# Patient Record
Sex: FEMALE | URBAN - METROPOLITAN AREA
[De-identification: names, ages, dates, MRNs, and addresses within clinical notes are randomized per-mention and may not be internally consistent; named-entity substitution may affect disease eponyms.]

---

## 2024-11-05 DIAGNOSIS — R10.11 RIGHT UPPER QUADRANT PAIN: Primary | ICD-10-CM

## 2024-11-07 ENCOUNTER — TELEPHONE (OUTPATIENT)
Dept: MATERNAL FETAL MEDICINE | Facility: CLINIC | Age: 29
End: 2024-11-07
Payer: MEDICAID

## 2024-11-07 DIAGNOSIS — O36.80X0 PREGNANCY WITH UNCERTAIN FETAL VIABILITY, SINGLE OR UNSPECIFIED FETUS: Primary | ICD-10-CM

## 2024-11-11 ENCOUNTER — TELEPHONE (OUTPATIENT)
Dept: MATERNAL FETAL MEDICINE | Facility: CLINIC | Age: 29
End: 2024-11-11
Payer: MEDICAID

## 2024-11-15 ENCOUNTER — PROCEDURE VISIT (OUTPATIENT)
Dept: MATERNAL FETAL MEDICINE | Facility: CLINIC | Age: 29
End: 2024-11-15
Payer: MEDICAID

## 2024-11-15 DIAGNOSIS — Z36.89 ENCOUNTER FOR FETAL ANATOMIC SURVEY: Primary | ICD-10-CM

## 2024-11-15 DIAGNOSIS — O36.80X0 PREGNANCY WITH UNCERTAIN FETAL VIABILITY, SINGLE OR UNSPECIFIED FETUS: ICD-10-CM

## 2024-11-15 PROCEDURE — 76801 OB US < 14 WKS SINGLE FETUS: CPT | Mod: PBBFAC | Performed by: OBSTETRICS & GYNECOLOGY

## 2024-11-26 ENCOUNTER — HOSPITAL ENCOUNTER (EMERGENCY)
Facility: HOSPITAL | Age: 29
Discharge: HOME | End: 2024-11-26
Attending: EMERGENCY MEDICINE
Payer: MEDICAID

## 2024-11-26 VITALS
RESPIRATION RATE: 16 BRPM | OXYGEN SATURATION: 100 % | TEMPERATURE: 98 F | DIASTOLIC BLOOD PRESSURE: 81 MMHG | HEART RATE: 78 BPM | WEIGHT: 159 LBS | SYSTOLIC BLOOD PRESSURE: 124 MMHG

## 2024-11-26 DIAGNOSIS — Z3A.12 12 WEEKS GESTATION OF PREGNANCY: Primary | ICD-10-CM

## 2024-11-26 PROCEDURE — 99284 EMERGENCY DEPT VISIT MOD MDM: CPT | Mod: 25

## 2024-11-26 NOTE — DISCHARGE INSTRUCTIONS
Ultrasound Is normal.  Follow-up with your OB.    Thank you for coming to our Emergency Department today. It is important to remember that some problems are difficult to diagnose and may not be found during your first visit. Be sure to follow up with your primary care doctor and review any labs/imaging that was performed with them. If you do not have a primary care doctor, you may contact the one listed on your discharge paperwork or you may also call the [facility] Appointment Desk at [phone] to schedule an appointment with one.     All medications may potentially have side effects and it is impossible to predict which medications may give you side effects. If you feel that you are having a negative effect of any medication you should immediately stop taking them and seek medical attention.    Return to the ER with any questions/concerns, new/concerning symptoms, worsening or failure to improve. Do not drive or make any important decisions for 24 hours if you have received any pain medications, sedatives or mood altering drugs during your ER visit.

## 2024-11-26 NOTE — ED TRIAGE NOTES
Pt to ED from [facility]. States she went to clinic for routine prenatal visit. States she was sent to ED because fetal heart tones could not be assessed. Pt denies any abd cramping or vaginal bleeding. Pt states she is approximately 12 weeks pregnant. Pt denies any N/V or dysuria. Pt AAOx4. VSS

## 2024-11-26 NOTE — ED PROVIDER NOTES
Encounter Date: 2024    SCRIBE #1 NOTE: I, Arnulfo Maxwell Do, am scribing for, and in the presence of,  Floyd Betancourt MD. I have scribed the following portions of the note - Other sections scribed: HPI, ROS, PE.       History     Chief Complaint   Patient presents with    Miscarriage     Pt arrived in ED, c/o possible miscarriage. Pt is 12 weeks gestation. Pt reports being at a routine doctor's appointment a few minutes ago and being sent to ED, due to them not finding a heartbeat. Pt c/o headache and n/v. Pt denies any CP, SOB, abd pain or diarrhea.      This is a 30 y/o female  at 12w2d gestational age with no PMHx, who presents to the Emergency department complaining of a possible miscarriage onset today. Per chart review 2024, patient was seen for a routine prenatal appointment at [facility]. She denies any loss of fluid at this visit. There were non-reassuring fetal heart tones. Patient reports she was referred to the ED for further evaluation. She does report some mild general abdominal pain this morning, however notes her pain has since resolved. She also reports intermittent lower back pain, however denies any currently. No other exacerbating or alleviating factors. Patient denies any vaginal bleeding, vaginal pain, dysuria, or other associated symptoms.    The history is provided by the patient. A  was used (Uriel. Lori Cody LPN at bedside).     Review of patient's allergies indicates:  No Known Allergies  History reviewed. No pertinent past medical history.  History reviewed. No pertinent surgical history.  No family history on file.  Social History     Tobacco Use    Smoking status: Never     Passive exposure: Never    Smokeless tobacco: Never   Substance Use Topics    Alcohol use: Never    Drug use: Never     Review of Systems   Constitutional:  Negative for fever.   HENT:  Negative for facial swelling and voice change.    Eyes:  Negative for pain.    Respiratory:  Negative for choking and shortness of breath.    Cardiovascular:  Negative for chest pain.   Gastrointestinal:  Positive for abdominal pain. Negative for nausea and vomiting.   Genitourinary:  Negative for dysuria and frequency.   Musculoskeletal:  Positive for back pain.   Skin:  Negative for rash.   Neurological:  Negative for weakness and numbness.   Psychiatric/Behavioral:  Negative for self-injury.        Physical Exam     Initial Vitals [11/26/24 1014]   BP Pulse Resp Temp SpO2   126/82 83 16 98.2 °F (36.8 °C) 100 %      MAP       --         Physical Exam    Nursing note and vitals reviewed.  Constitutional: She is not diaphoretic. No distress.   HENT:   Head: Normocephalic and atraumatic.   Protecting airway   Eyes: Conjunctivae and EOM are normal. No scleral icterus.   Neck: Neck supple. No tracheal deviation present.   Normal range of motion.  Cardiovascular:  Normal rate, regular rhythm and intact distal pulses.           Pulmonary/Chest: No stridor. No respiratory distress.   Speaking in full sentences   Abdominal: Abdomen is soft. She exhibits no distension. There is no abdominal tenderness.   Musculoskeletal:         General: No tenderness or edema.      Cervical back: Normal range of motion and neck supple.     Neurological: She is alert. She has normal strength. No cranial nerve deficit or sensory deficit.   Skin: Skin is warm and dry.   Psychiatric: She has a normal mood and affect.         ED Course   Procedures  Labs Reviewed - No data to display         Imaging Results              US OB <14 Wks, TransAbd, Single Gestation (Final result)  Result time 11/26/24 11:57:01   Procedure changed from US OB <14 Wks TransAbd & TransVag, Single Gestation (XPD)     Final result by Godfrey Jeter MD (11/26/24 11:57:01)                   Impression:      Single live intrauterine pregnancy with estimated gestational age 12 weeks 2 days and estimated due date 06/08/2025.      Electronically  signed by: Godfrey Jeter  Date:    11/26/2024  Time:    11:57               Narrative:    EXAMINATION:  US OB <14 WEEKS TRANSABDOM, SINGLE GESTATION    CLINICAL HISTORY:  Vag Bleeding;    TECHNIQUE:  Transabdominal sonography of the pelvis was performed, followed by transvaginal sonography to better evaluate the uterus and ovaries.    COMPARISON:  None.    FINDINGS:  Intrauterine gestation(s): Single    Gestational sac: Present.    Yolk sac: None.    Crown-rump length (CRL): 5.7 cm    Cardiac activity: 159 bpm    Subchorionic hemorrhage: None.    Right ovary: There is a 3.0 cm cystic lesion in the right ovary, likely a corpus luteum cyst.    Left ovary: Normal.    Miscellaneous: No Free Fluid.                                       Medications - No data to display    Medical Decision Making  Differential diagnosis include but are not limited to: 1st trimester pregnancy, worried well, pregnancy complications.     Patient is afebrile and in no acute distress at time history and physical.  Vitals within acceptable ranges.  She has no abdominal tenderness.  She denies vaginal bleeding or leakage of fluids.  Review of chart demonstrates that patient was sent from clinic due to nurse not being able to locate fetal heart tones.  Ultrasounds demonstrates a single intrauterine pregnancy with a fetal heart rate of 159.  Patient remains clinically stable in the emergency department.  Patient does not appear to require further emergent management.  She is clinically stable and fit for discharge to follow up with her OB.  counseled on supportive care, appropriate medication usage, concerning symptoms for which to return to ER and the importance of follow up. Understanding and agreement with treatment plan was expressed.         Amount and/or Complexity of Data Reviewed  External Data Reviewed: notes.     Details: See HPI.  Radiology: ordered. Decision-making details documented in ED Course.            Scribe Attestation:   Scribe  #1: I performed the above scribed service and the documentation accurately describes the services I performed. I attest to the accuracy of the note.                               Clinical Impression:  Final diagnoses:  [Z3A.12] 12 weeks gestation of pregnancy (Primary)       I, Floyd Betancourt , personally performed the services described in this documentation. All medical record entries made by the scribe were at my direction and in my presence. I have reviewed the chart and agree that the record reflects my personal performance and is accurate and complete.      DISCLAIMER: This note was prepared with [employer] voice recognition transcription software. Garbled syntax, mangled pronouns, and other bizarre constructions may be attributed to that software system.     ED Disposition Condition    Discharge Stable          ED Prescriptions    None       Follow-up Information       Follow up With Specialties Details Why Contact Info    Rusty, Ladan ARREOLA, APRN Family Medicine Schedule an appointment as soon as possible for a visit   [address] [facility] [address]  [phone]               Floyd Betancourt MD  11/26/24 6746

## 2024-11-29 ENCOUNTER — HOSPITAL ENCOUNTER (EMERGENCY)
Facility: HOSPITAL | Age: 29
Discharge: HOME | End: 2024-11-29
Attending: STUDENT IN AN ORGANIZED HEALTH CARE EDUCATION/TRAINING PROGRAM
Payer: MEDICAID

## 2024-11-29 VITALS
DIASTOLIC BLOOD PRESSURE: 66 MMHG | OXYGEN SATURATION: 100 % | SYSTOLIC BLOOD PRESSURE: 133 MMHG | HEART RATE: 97 BPM | HEIGHT: 63 IN | TEMPERATURE: 98 F | BODY MASS INDEX: 28.17 KG/M2 | WEIGHT: 159 LBS | RESPIRATION RATE: 18 BRPM

## 2024-11-29 DIAGNOSIS — R82.71 ASYMPTOMATIC BACTERIURIA DURING PREGNANCY: ICD-10-CM

## 2024-11-29 DIAGNOSIS — J06.9 VIRAL URI WITH COUGH: Primary | ICD-10-CM

## 2024-11-29 DIAGNOSIS — O99.891 ASYMPTOMATIC BACTERIURIA DURING PREGNANCY: ICD-10-CM

## 2024-11-29 LAB
BILIRUB UR QL STRIP: NEGATIVE
CLARITY UR: CLEAR
COLOR UR: YELLOW
CTP QC/QA: YES
CTP QC/QA: YES
GLUCOSE UR QL STRIP: NEGATIVE
HGB UR QL STRIP: NEGATIVE
KETONES UR QL STRIP: NEGATIVE
LEUKOCYTE ESTERASE UR QL STRIP: ABNORMAL
MICROSCOPIC COMMENT: ABNORMAL
NITRITE UR QL STRIP: NEGATIVE
PH UR STRIP: 6 [PH] (ref 5–8)
POC MOLECULAR INFLUENZA A AGN: NEGATIVE
POC MOLECULAR INFLUENZA B AGN: NEGATIVE
PROT UR QL STRIP: NEGATIVE
RBC #/AREA URNS HPF: 3 /HPF (ref 0–4)
SARS-COV-2 RDRP RESP QL NAA+PROBE: NEGATIVE
SP GR UR STRIP: 1.02 (ref 1–1.03)
SQUAMOUS #/AREA URNS HPF: 1 /HPF
URN SPEC COLLECT METH UR: ABNORMAL
UROBILINOGEN UR STRIP-ACNC: ABNORMAL EU/DL
WBC #/AREA URNS HPF: 12 /HPF (ref 0–5)

## 2024-11-29 PROCEDURE — 81000 URINALYSIS NONAUTO W/SCOPE: CPT

## 2024-11-29 PROCEDURE — 87086 URINE CULTURE/COLONY COUNT: CPT

## 2024-11-29 PROCEDURE — 87502 INFLUENZA DNA AMP PROBE: CPT

## 2024-11-29 PROCEDURE — 99284 EMERGENCY DEPT VISIT MOD MDM: CPT

## 2024-11-29 PROCEDURE — 87635 SARS-COV-2 COVID-19 AMP PRB: CPT

## 2024-11-29 RX ORDER — ACETAMINOPHEN 500 MG
500 TABLET ORAL EVERY 6 HOURS PRN
Qty: 20 TABLET | Refills: 0 | Status: SHIPPED | OUTPATIENT
Start: 2024-11-29

## 2024-11-29 RX ORDER — VITAMIN A 3000 MCG
1 CAPSULE ORAL
Qty: 50 ML | Refills: 0 | Status: SHIPPED | OUTPATIENT
Start: 2024-11-29

## 2024-11-29 RX ORDER — GUAIFENESIN 100 MG/5ML
100-200 SOLUTION ORAL EVERY 4 HOURS PRN
Qty: 60 ML | Refills: 0 | Status: SHIPPED | OUTPATIENT
Start: 2024-11-29 | End: 2024-12-09

## 2024-11-29 RX ORDER — CEPHALEXIN 500 MG/1
500 CAPSULE ORAL 2 TIMES DAILY
Qty: 10 CAPSULE | Refills: 0 | Status: SHIPPED | OUTPATIENT
Start: 2024-11-29 | End: 2024-12-04

## 2024-11-29 NOTE — Clinical Note
"Ruma "Ruma" Tiffany was seen and treated in our emergency department on 11/29/2024.  She may return to work on 12/02/2024.       If you have any questions or concerns, please don't hesitate to call.      David Reynoso PA-C"

## 2024-11-30 NOTE — DISCHARGE INSTRUCTIONS
You were seen in the emergency department today for viral URI.  Please take all medications as prescribed and as we discussed.  Follow-up with specialist if instructed to do so.  It is important to remember that some problems are difficult to diagnose and may not be found during your Emergency Department visit. Be sure to follow up with your primary care doctor and review all labs/imaging/tests that were performed during this visit with them. Some labs/tests may be outside of the normal range and require non-emergent follow-up and further investigation to help diagnose/exclude/prevent complications or other medical conditions. Return to the emergency department for any new or worsening symptoms. Thank you for allowing me to care for you today, it was my pleasure. I hope you get to feeling better soon!

## 2024-11-30 NOTE — ED PROVIDER NOTES
Encounter Date: 2024    SCRIBE #1 NOTE: IArgenis, am scribing for, and in the presence of,  David Reynoso PA-C. I have scribed the following portions of the note - Other sections scribed: HPI, ROS, PE.       History     Chief Complaint   Patient presents with    Nasal Congestion    Cough     Pt to ED from home with c/o cough, headache and congestion x 3 days. Pt states she is 12 weeks pregnant and is unsure of what medications she can take while pregnant. Pt denies n/v/d.     Patient is a 29 y.o. female  approximately 12 weeks gestational age with no past medical history who presents to the Emergency Department for evaluation of URI symptoms x 3 days.  Symptoms include headache, cough, congestion, and rhinorrhea.  She has not taken any medications for the symptoms. Her children are also being seen in the ED for the same symptoms.  She denies chest pain, shortness of breath, abdominal pain. Denies any other associated symptoms including pregnancy related. She states she is here more so because she is unsure what she can take.     The history is provided by the patient. The history is limited by a language barrier (Patient speaks Hatian Creole.). A  was used (Tanner CHIRINOS).     Review of patient's allergies indicates:  No Known Allergies  History reviewed. No pertinent past medical history.  History reviewed. No pertinent surgical history.  No family history on file.  Social History     Tobacco Use    Smoking status: Never     Passive exposure: Never    Smokeless tobacco: Never   Substance Use Topics    Alcohol use: Never    Drug use: Never     Review of Systems   Constitutional:  Negative for chills and fever.   HENT:  Positive for congestion and rhinorrhea. Negative for ear pain and sore throat.    Eyes:  Negative for visual disturbance.   Respiratory:  Positive for cough. Negative for shortness of breath.    Cardiovascular:  Negative for chest pain.   Gastrointestinal:   Negative for abdominal pain, nausea and vomiting.   Genitourinary:  Negative for decreased urine volume, dysuria, flank pain, frequency, menstrual problem and pelvic pain.   Musculoskeletal:  Negative for neck pain and neck stiffness.   Neurological:  Positive for headaches. Negative for dizziness and light-headedness.       Physical Exam     Initial Vitals [11/29/24 1807]   BP Pulse Resp Temp SpO2   133/66 97 18 98.4 °F (36.9 °C) 100 %      MAP       --         Physical Exam    Nursing note and vitals reviewed.  Constitutional: She appears well-developed and well-nourished.   HENT:   Head: Normocephalic and atraumatic.   Right Ear: External ear normal.   Left Ear: External ear normal.   There is no posterior oropharyngeal erythema however a postnasal drip is present, no tonsillar swelling, no oropharyngeal exudates, uvula is midline. Normal dentition. No trismus.  No muffled voice. No submandibular swelling. Patient is tolerating secretions without difficulty.  Patient is speaking in full sentences on exam without difficulty.  Bilateral tympanic membranes are pearly gray without erythema, bulging, perforation.  There is no postauricular swelling, or overlying erythema or tenderness to palpation over mastoids bilaterally.    Neck: Carotid bruit is not present.   Normal range of motion.  Cardiovascular:  Normal rate, regular rhythm, normal heart sounds and intact distal pulses.     Exam reveals no gallop and no friction rub.       No murmur heard.  Pulmonary/Chest: Breath sounds normal. No respiratory distress. She has no wheezes. She has no rhonchi. She has no rales.   Abdominal: Abdomen is soft. Bowel sounds are normal. There is no abdominal tenderness. There is no rebound and no guarding.   Musculoskeletal:         General: Normal range of motion.      Cervical back: Normal range of motion.     Neurological: She is alert and oriented to person, place, and time. GCS score is 15. GCS eye subscore is 4. GCS verbal  subscore is 5. GCS motor subscore is 6.   Psychiatric: She has a normal mood and affect.         ED Course   Procedures  Labs Reviewed   URINALYSIS, REFLEX TO URINE CULTURE - Abnormal       Result Value    Specimen UA Urine, Clean Catch      Color, UA Yellow      Appearance, UA Clear      pH, UA 6.0      Specific Gravity, UA 1.020      Protein, UA Negative      Glucose, UA Negative      Ketones, UA Negative      Bilirubin (UA) Negative      Occult Blood UA Negative      Nitrite, UA Negative      Urobilinogen, UA 2.0-3.0 (*)     Leukocytes, UA 2+ (*)     Narrative:     Specimen Source->Urine   URINALYSIS MICROSCOPIC - Abnormal    RBC, UA 3      WBC, UA 12 (*)     Squam Epithel, UA 1      Microscopic Comment SEE COMMENT      Narrative:     Specimen Source->Urine   CULTURE, URINE   CULTURE, URINE   C. TRACHOMATIS/N. GONORRHOEAE BY AMP DNA   SARS-COV-2 RDRP GENE    POC Rapid COVID Negative       Acceptable Yes     POCT INFLUENZA A/B MOLECULAR    POC Molecular Influenza A Ag Negative      POC Molecular Influenza B Ag Negative       Acceptable Yes            Imaging Results    None          Medications - No data to display  Medical Decision Making  This is an emergent evaluation of a 29 y.o. female  approximately 12 weeks gestational age with no past medical history who presents to the Emergency Department for evaluation of URI symptoms x 3 days.  Symptoms include headache, cough, congestion, and rhinorrhea.    Patient looks well clinically. There is no posterior oropharyngeal erythema however a postnasal drip is present, no tonsillar swelling, no oropharyngeal exudates, uvula is midline. Normal dentition. No trismus.  No muffled voice. No submandibular swelling. Patient is tolerating secretions without difficulty.  Patient is speaking in full sentences on exam without difficulty.  Bilateral tympanic membranes are pearly gray without erythema, bulging, perforation.  There is no  postauricular swelling, or overlying erythema or tenderness to palpation over mastoids bilaterally. Regular rate rhythm without murmurs.  No carotid bruits appreciated on exam. Lungs are clear to auscultation bilaterally.  Abdomen is soft, nontender, gravid abdomen, with normal bowel sounds.     Differential diagnosis includes but is not limited to COVID, flu, other viral syndrome.  Considered but doubt pneumonia.    Workup initiated with viral swabs.  Urinalysis ordered in triage.  Vital signs, chart, labs, and/or imaging were all reviewed.  See ED course below and interpretations above. My overall impression is asymptomatic bacteria pregnancy, viral syndrome. Will discharge home with nasal saline spray, Keflex, Robitussin, Tylenol with OBGYN follow-up. Patient is very well appearing, and in no acute distress. Vital signs are reassuring here in the emergency department, patient is afebrile, breathing comfortable, satting 100 % on room air. Patient/Caregiver is stable for discharge at this time.  Patient/Caregiver was informed of results and plan of care. Patient/Caregiver verbalized understanding of care plan. All questions and concerns were addressed. Discussed strict return precautions with the patient/caregiver. Instructed follow up with primary care provider within 1 week.      David Reynoso PA-C    DISCLAIMER: This note was prepared with [employer] voice recognition transcription software. Garbled syntax, mangled pronouns, and other bizarre constructions may be attributed to that software system.       Amount and/or Complexity of Data Reviewed  Labs: ordered. Decision-making details documented in ED Course.    Risk  OTC drugs.  Prescription drug management.            Scribe Attestation:   Scribe #1: I performed the above scribed service and the documentation accurately describes the services I performed. I attest to the accuracy of the note.        ED Course as of 11/29/24 2233 Fri Nov 29, 2024   1902 BP:  133/66 [TM]   1902 Temp: 98.4 °F (36.9 °C) [TM]   1902 Pulse: 97 [TM]   1902 Resp: 18 [TM]   1902 SpO2: 100 % [TM]   1941 POCT COVID-19 Rapid Screening  COVID negative. [TM]   1941 POCT Influenza A/B Molecular  Flu negative. [TM]   1942 Urinalysis, Reflex to Urine Culture Urine, Clean Catch(!)  Urinalysis showing 2+ leukocytes.  Ordered GC urine.  Ordered urine culture. [TM]   1943 Will discharge home with keflex, nasal saline nose spray, Tylenol, Robitussin with OBGYN follow-up.  No OBGYN related complaints today.  We will have her follow-up however. [TM]      ED Course User Index  [TM] David Reynoso PA-C I, Trevor Marler PA-C, personally performed the services described in this documentation. All medical record entries made by the scribe were at my direction and in my presence. I have reviewed the chart and agree that the record reflects my personal performance and is accurate and complete.      DISCLAIMER: This note was prepared with [employer] voice recognition transcription software. Garbled syntax, mangled pronouns, and other bizarre constructions may be attributed to that software system.       Clinical Impression:  Final diagnoses:  [J06.9] Viral URI with cough (Primary)  [O99.891, R82.71] Asymptomatic bacteriuria during pregnancy          ED Disposition Condition    Discharge Stable          ED Prescriptions       Medication Sig Dispense Start Date End Date Auth. Provider    acetaminophen (TYLENOL) 500 MG tablet Take 1 tablet (500 mg total) by mouth every 6 (six) hours as needed. 20 tablet 11/29/2024 -- David Reynoso PA-C    guaiFENesin 100 mg/5 ml (ROBITUSSIN) 100 mg/5 mL syrup Take 5-10 mLs (100-200 mg total) by mouth every 4 (four) hours as needed for Cough. 60 mL 11/29/2024 12/9/2024 David Reynoso PA-C    sodium chloride (SALINE NASAL) 0.65 % nasal spray 1 spray by Nasal route as needed for Congestion. 50 mL 11/29/2024 -- David Reynoso PA-C    cephALEXin (KEFLEX) 500 MG  capsule Take 1 capsule (500 mg total) by mouth 2 (two) times a day. for 5 days 10 capsule 11/29/2024 12/4/2024 David Reynoso PA-C          Follow-up Information       Follow up With Specialties Details Why Contact [facility] Emergency Dept Emergency Medicine Go to  As needed, If symptoms worsen, or new symptoms develop [address] [facility] [place] [id number] [phone]    Primary care doctor  Schedule an appointment as soon as possible for a visit in 3 days               David Reynoso PA-C  11/29/24 3834

## 2024-12-01 LAB — BACTERIA UR CULT: NORMAL

## 2025-01-13 ENCOUNTER — PROCEDURE VISIT (OUTPATIENT)
Dept: MATERNAL FETAL MEDICINE | Facility: CLINIC | Age: 30
End: 2025-01-13
Payer: MEDICAID

## 2025-01-13 DIAGNOSIS — Z36.2 ENCOUNTER FOR FOLLOW-UP ULTRASOUND OF FETAL ANATOMY: Primary | ICD-10-CM

## 2025-01-13 DIAGNOSIS — Z36.89 ENCOUNTER FOR FETAL ANATOMIC SURVEY: ICD-10-CM

## 2025-01-13 PROCEDURE — 76805 OB US >/= 14 WKS SNGL FETUS: CPT | Mod: PBBFAC | Performed by: STUDENT IN AN ORGANIZED HEALTH CARE EDUCATION/TRAINING PROGRAM

## 2025-02-17 ENCOUNTER — TELEPHONE (OUTPATIENT)
Dept: MATERNAL FETAL MEDICINE | Facility: CLINIC | Age: 30
End: 2025-02-17
Payer: MEDICAID

## 2025-02-17 ENCOUNTER — PROCEDURE VISIT (OUTPATIENT)
Dept: MATERNAL FETAL MEDICINE | Facility: CLINIC | Age: 30
End: 2025-02-17
Payer: MEDICAID

## 2025-02-17 DIAGNOSIS — Z36.2 ENCOUNTER FOR FOLLOW-UP ULTRASOUND OF FETAL ANATOMY: Primary | ICD-10-CM

## 2025-02-17 DIAGNOSIS — Z36.2 ENCOUNTER FOR FOLLOW-UP ULTRASOUND OF FETAL ANATOMY: ICD-10-CM

## 2025-02-17 DIAGNOSIS — Z36.89 ENCOUNTER FOR ULTRASOUND TO ASSESS FETAL GROWTH: ICD-10-CM

## 2025-02-17 PROCEDURE — 76816 OB US FOLLOW-UP PER FETUS: CPT | Mod: PBBFAC | Performed by: STUDENT IN AN ORGANIZED HEALTH CARE EDUCATION/TRAINING PROGRAM

## 2025-02-17 NOTE — TELEPHONE ENCOUNTER
Attempted to reach patient with [employer] . No answer. Left voicemail will callback number as she needs a sooner appointment then scheduled.

## 2025-03-21 ENCOUNTER — TELEPHONE (OUTPATIENT)
Dept: MATERNAL FETAL MEDICINE | Facility: CLINIC | Age: 30
End: 2025-03-21
Payer: MEDICAID

## 2025-04-14 ENCOUNTER — TELEPHONE (OUTPATIENT)
Dept: MATERNAL FETAL MEDICINE | Facility: CLINIC | Age: 30
End: 2025-04-14

## 2025-04-14 ENCOUNTER — PROCEDURE VISIT (OUTPATIENT)
Dept: MATERNAL FETAL MEDICINE | Facility: CLINIC | Age: 30
End: 2025-04-14
Payer: MEDICAID

## 2025-04-14 DIAGNOSIS — Z36.89 ENCOUNTER FOR ULTRASOUND TO ASSESS FETAL GROWTH: Primary | ICD-10-CM

## 2025-04-14 DIAGNOSIS — O28.3 ABNORMAL FETAL ULTRASOUND: ICD-10-CM

## 2025-04-14 DIAGNOSIS — Z36.2 ENCOUNTER FOR FOLLOW-UP ULTRASOUND OF FETAL ANATOMY: ICD-10-CM

## 2025-04-14 DIAGNOSIS — Z36.89 ENCOUNTER FOR ULTRASOUND TO ASSESS FETAL GROWTH: ICD-10-CM

## 2025-04-14 PROCEDURE — 76816 OB US FOLLOW-UP PER FETUS: CPT | Mod: PBBFAC | Performed by: OBSTETRICS & GYNECOLOGY

## 2025-04-14 PROCEDURE — 76817 TRANSVAGINAL US OBSTETRIC: CPT | Mod: 26,S$PBB,, | Performed by: OBSTETRICS & GYNECOLOGY

## 2025-04-14 NOTE — TELEPHONE ENCOUNTER
Call to patient with language line. Man answered and stated that she was not with him. Asked him if we could get her number. He then called her on [address] way, I asked if a  was needed and he said no he could interpret. She was scheduled for a u/s and consult in 3-4 wks. She was also told about her appointment with the OB doctor at the  location as she is transferring from [place]. She stated she did know about that appointment. She was given date and time of her appointments with M.

## 2025-04-16 ENCOUNTER — INITIAL PRENATAL (OUTPATIENT)
Dept: OBSTETRICS AND GYNECOLOGY | Facility: CLINIC | Age: 30
End: 2025-04-16
Payer: MEDICAID

## 2025-04-16 VITALS
WEIGHT: 158.75 LBS | BODY MASS INDEX: 28.12 KG/M2 | DIASTOLIC BLOOD PRESSURE: 68 MMHG | SYSTOLIC BLOOD PRESSURE: 118 MMHG

## 2025-04-16 DIAGNOSIS — Z3A.32 32 WEEKS GESTATION OF PREGNANCY: Primary | ICD-10-CM

## 2025-04-16 DIAGNOSIS — Z86.19 HISTORY OF SEXUALLY TRANSMITTED DISEASE: ICD-10-CM

## 2025-04-16 DIAGNOSIS — O44.03 PLACENTA PREVIA IN THIRD TRIMESTER: ICD-10-CM

## 2025-04-16 DIAGNOSIS — Z98.891 HISTORY OF CESAREAN SECTION: ICD-10-CM

## 2025-04-16 DIAGNOSIS — Z11.4 SCREENING FOR HUMAN IMMUNODEFICIENCY VIRUS: ICD-10-CM

## 2025-04-16 DIAGNOSIS — O28.3 ABNORMAL FETAL ULTRASOUND: ICD-10-CM

## 2025-04-16 PROCEDURE — 99213 OFFICE O/P EST LOW 20 MIN: CPT | Mod: PBBFAC,TH | Performed by: PHYSICIAN ASSISTANT

## 2025-04-16 PROCEDURE — 99999 PR PBB SHADOW E&M-EST. PATIENT-LVL III: CPT | Mod: PBBFAC,,, | Performed by: PHYSICIAN ASSISTANT

## 2025-04-16 NOTE — PROGRESS NOTES
Here for routine OB appt at 32w5d, with no complaints.  Reports good FM.  Denies LOF, denies VB, denies contractions.  Discussed placenta previa and precautions, emergency reasons to come to hospital. Discussed MFM appointment as they have not been able to get into contact with her. Stressed importance of future appointments. Given informational handout regarding placenta previa.   Reviewed warning signs of Labor and Preeclampsia.  Daily FM counts reinforced.  F/U scheduled 2 weeks

## 2025-04-29 ENCOUNTER — ROUTINE PRENATAL (OUTPATIENT)
Dept: OBSTETRICS AND GYNECOLOGY | Facility: CLINIC | Age: 30
End: 2025-04-29
Payer: MEDICAID

## 2025-04-29 VITALS
WEIGHT: 160.94 LBS | DIASTOLIC BLOOD PRESSURE: 74 MMHG | BODY MASS INDEX: 28.51 KG/M2 | SYSTOLIC BLOOD PRESSURE: 116 MMHG

## 2025-04-29 DIAGNOSIS — Z3A.34 PREGNANCY WITH 34 COMPLETED WEEKS GESTATION: ICD-10-CM

## 2025-04-29 DIAGNOSIS — O44.03 PLACENTA PREVIA IN THIRD TRIMESTER: Primary | ICD-10-CM

## 2025-04-29 DIAGNOSIS — Z98.891 HISTORY OF CESAREAN SECTION: ICD-10-CM

## 2025-04-29 PROCEDURE — 99999 PR PBB SHADOW E&M-EST. PATIENT-LVL II: CPT | Mod: PBBFAC,,, | Performed by: OBSTETRICS & GYNECOLOGY

## 2025-04-29 PROCEDURE — 99212 OFFICE O/P EST SF 10 MIN: CPT | Mod: PBBFAC,TH | Performed by: OBSTETRICS & GYNECOLOGY

## 2025-04-29 PROCEDURE — 99214 OFFICE O/P EST MOD 30 MIN: CPT | Mod: TH,S$PBB,, | Performed by: OBSTETRICS & GYNECOLOGY

## 2025-04-29 NOTE — PROGRESS NOTES
[facility]  used.  Discussed need for RLTCS due to placenta previa.  Pt also desires tubal at same time.  Pt is transfer of care from [facility].  Tubal consents signed.  RLTCS/'PPTL scheduled 25 @ 37 weeks.    Assessment/Plan  1. Placenta previa in third trimester    2. History of  section    3. Pregnancy with 34 completed weeks gestation      30 minutes spend in total time reviewing labs, prior sonos and previous visits

## 2025-05-06 ENCOUNTER — ANESTHESIA EVENT (OUTPATIENT)
Dept: OBSTETRICS AND GYNECOLOGY | Facility: HOSPITAL | Age: 30
End: 2025-05-06
Payer: MEDICAID

## 2025-05-06 ENCOUNTER — ANESTHESIA (OUTPATIENT)
Dept: OBSTETRICS AND GYNECOLOGY | Facility: HOSPITAL | Age: 30
End: 2025-05-06
Payer: MEDICAID

## 2025-05-06 ENCOUNTER — HOSPITAL ENCOUNTER (INPATIENT)
Facility: HOSPITAL | Age: 30
LOS: 3 days | Discharge: HOME | End: 2025-05-09
Attending: OBSTETRICS & GYNECOLOGY | Admitting: OBSTETRICS & GYNECOLOGY
Payer: MEDICAID

## 2025-05-06 DIAGNOSIS — Z98.891 STATUS POST CESAREAN SECTION: ICD-10-CM

## 2025-05-06 DIAGNOSIS — Z3A.35 PREGNANCY WITH 35 COMPLETED WEEKS GESTATION: ICD-10-CM

## 2025-05-06 DIAGNOSIS — Z34.90 PREGNANCY: ICD-10-CM

## 2025-05-06 DIAGNOSIS — O34.219 HISTORY OF CESAREAN SECTION COMPLICATING PREGNANCY: ICD-10-CM

## 2025-05-06 DIAGNOSIS — O44.03 PLACENTA PREVIA, THIRD TRIMESTER: ICD-10-CM

## 2025-05-06 DIAGNOSIS — O42.919 PRETERM PREMATURE RUPTURE OF MEMBRANES (PPROM) DELIVERED, CURRENT HOSPITALIZATION: ICD-10-CM

## 2025-05-06 LAB
ABORH RETYPE: NORMAL
ABSOLUTE EOSINOPHIL (OHS): 0.01 K/UL
ABSOLUTE EOSINOPHIL (OHS): 0.08 K/UL
ABSOLUTE MONOCYTE (OHS): 0.28 K/UL (ref 0.3–1)
ABSOLUTE MONOCYTE (OHS): 0.67 K/UL (ref 0.3–1)
ABSOLUTE NEUTROPHIL COUNT (OHS): 13.04 K/UL (ref 1.8–7.7)
ABSOLUTE NEUTROPHIL COUNT (OHS): 6.71 K/UL (ref 1.8–7.7)
ALBUMIN SERPL BCP-MCNC: 2.9 G/DL (ref 3.5–5.2)
ALP SERPL-CCNC: 63 UNIT/L (ref 40–150)
ALT SERPL W/O P-5'-P-CCNC: 12 UNIT/L (ref 10–44)
ANION GAP (OHS): 8 MMOL/L (ref 8–16)
AST SERPL-CCNC: 19 UNIT/L (ref 11–45)
BACTERIA #/AREA URNS AUTO: ABNORMAL /HPF
BASOPHILS # BLD AUTO: 0.03 K/UL
BASOPHILS # BLD AUTO: 0.04 K/UL
BASOPHILS NFR BLD AUTO: 0.2 %
BASOPHILS NFR BLD AUTO: 0.4 %
BILIRUB SERPL-MCNC: 0.6 MG/DL (ref 0.1–1)
BILIRUB UR QL STRIP.AUTO: NEGATIVE
BUN SERPL-MCNC: 2 MG/DL (ref 6–20)
CALCIUM SERPL-MCNC: 8.6 MG/DL (ref 8.7–10.5)
CHLORIDE SERPL-SCNC: 109 MMOL/L (ref 95–110)
CLARITY UR: ABNORMAL
CO2 SERPL-SCNC: 20 MMOL/L (ref 23–29)
COLOR UR AUTO: COLORLESS
CREAT SERPL-MCNC: 0.6 MG/DL (ref 0.5–1.4)
ERYTHROCYTE [DISTWIDTH] IN BLOOD BY AUTOMATED COUNT: 15.2 % (ref 11.5–14.5)
ERYTHROCYTE [DISTWIDTH] IN BLOOD BY AUTOMATED COUNT: 15.5 % (ref 11.5–14.5)
GFR SERPLBLD CREATININE-BSD FMLA CKD-EPI: >60 ML/MIN/1.73/M2
GLUCOSE SERPL-MCNC: 79 MG/DL (ref 70–110)
GLUCOSE UR QL STRIP: NEGATIVE
GROUP & RH: NORMAL
HCT VFR BLD AUTO: 27.8 % (ref 37–48.5)
HCT VFR BLD AUTO: 30.8 % (ref 37–48.5)
HGB BLD-MCNC: 10.4 GM/DL (ref 12–16)
HGB BLD-MCNC: 9.2 GM/DL (ref 12–16)
HGB UR QL STRIP: ABNORMAL
HIV 1+2 AB+HIV1 P24 AG SERPL QL IA: NORMAL
HOLD SPECIMEN: NORMAL
IMM GRANULOCYTES # BLD AUTO: 0.14 K/UL (ref 0–0.04)
IMM GRANULOCYTES # BLD AUTO: 0.16 K/UL (ref 0–0.04)
IMM GRANULOCYTES NFR BLD AUTO: 1 % (ref 0–0.5)
IMM GRANULOCYTES NFR BLD AUTO: 1.3 % (ref 0–0.5)
INDIRECT COOMBS: NORMAL
KETONES UR QL STRIP: NEGATIVE
LEUKOCYTE ESTERASE UR QL STRIP: ABNORMAL
LYMPHOCYTES # BLD AUTO: 2.19 K/UL (ref 1–4.8)
LYMPHOCYTES # BLD AUTO: 3.14 K/UL (ref 1–4.8)
MCH RBC QN AUTO: 29 PG (ref 27–31)
MCH RBC QN AUTO: 29.1 PG (ref 27–31)
MCHC RBC AUTO-ENTMCNC: 33.1 G/DL (ref 32–36)
MCHC RBC AUTO-ENTMCNC: 33.8 G/DL (ref 32–36)
MCV RBC AUTO: 86 FL (ref 82–98)
MCV RBC AUTO: 88 FL (ref 82–98)
MICROSCOPIC COMMENT: ABNORMAL
NITRITE UR QL STRIP: NEGATIVE
NON-SQ EPI CELLS #/AREA URNS AUTO: <1 /HPF
NUCLEATED RBC (/100WBC) (OHS): 0 /100 WBC
NUCLEATED RBC (/100WBC) (OHS): 0 /100 WBC
PH UR STRIP: 7 [PH]
PLATELET # BLD AUTO: 249 K/UL (ref 150–450)
PLATELET # BLD AUTO: 302 K/UL (ref 150–450)
PMV BLD AUTO: 8.8 FL (ref 9.2–12.9)
PMV BLD AUTO: 8.9 FL (ref 9.2–12.9)
POTASSIUM SERPL-SCNC: 3.9 MMOL/L (ref 3.5–5.1)
PROT SERPL-MCNC: 6.7 GM/DL (ref 6–8.4)
PROT UR QL STRIP: NEGATIVE
RBC # BLD AUTO: 3.16 M/UL (ref 4–5.4)
RBC # BLD AUTO: 3.59 M/UL (ref 4–5.4)
RBC #/AREA URNS AUTO: 12 /HPF (ref 0–4)
RELATIVE EOSINOPHIL (OHS): 0.1 %
RELATIVE EOSINOPHIL (OHS): 0.7 %
RELATIVE LYMPHOCYTE (OHS): 13.9 % (ref 18–48)
RELATIVE LYMPHOCYTE (OHS): 29.1 % (ref 18–48)
RELATIVE MONOCYTE (OHS): 1.8 % (ref 4–15)
RELATIVE MONOCYTE (OHS): 6.2 % (ref 4–15)
RELATIVE NEUTROPHIL (OHS): 62.3 % (ref 38–73)
RELATIVE NEUTROPHIL (OHS): 83 % (ref 38–73)
RUPTURE OF MEMBRANE (OHS): POSITIVE
SODIUM SERPL-SCNC: 137 MMOL/L (ref 136–145)
SP GR UR STRIP: 1.01
SQUAMOUS #/AREA URNS AUTO: 3 /HPF
T PALLIDUM IGG+IGM SER QL: NORMAL
UROBILINOGEN UR STRIP-ACNC: NEGATIVE EU/DL
WBC # BLD AUTO: 10.78 K/UL (ref 3.9–12.7)
WBC # BLD AUTO: 15.71 K/UL (ref 3.9–12.7)
WBC #/AREA URNS AUTO: 74 /HPF (ref 0–5)
WBC CLUMPS UR QL AUTO: ABNORMAL

## 2025-05-06 PROCEDURE — 25000003 PHARM REV CODE 250: Performed by: ANESTHESIOLOGY

## 2025-05-06 PROCEDURE — 86901 BLOOD TYPING SEROLOGIC RH(D): CPT | Performed by: OBSTETRICS & GYNECOLOGY

## 2025-05-06 PROCEDURE — P9045 ALBUMIN (HUMAN), 5%, 250 ML: HCPCS | Mod: JZ,TB | Performed by: NURSE ANESTHETIST, CERTIFIED REGISTERED

## 2025-05-06 PROCEDURE — 37000008 HC ANESTHESIA 1ST 15 MINUTES: Performed by: OBSTETRICS & GYNECOLOGY

## 2025-05-06 PROCEDURE — 80053 COMPREHEN METABOLIC PANEL: CPT | Performed by: OBSTETRICS & GYNECOLOGY

## 2025-05-06 PROCEDURE — 63600175 PHARM REV CODE 636 W HCPCS: Performed by: NURSE ANESTHETIST, CERTIFIED REGISTERED

## 2025-05-06 PROCEDURE — 87086 URINE CULTURE/COLONY COUNT: CPT | Performed by: OBSTETRICS & GYNECOLOGY

## 2025-05-06 PROCEDURE — 71000039 HC RECOVERY, EACH ADD'L HOUR: Performed by: OBSTETRICS & GYNECOLOGY

## 2025-05-06 PROCEDURE — 99211 OFF/OP EST MAY X REQ PHY/QHP: CPT

## 2025-05-06 PROCEDURE — 86762 RUBELLA ANTIBODY: CPT | Performed by: OBSTETRICS & GYNECOLOGY

## 2025-05-06 PROCEDURE — 63600175 PHARM REV CODE 636 W HCPCS: Performed by: ANESTHESIOLOGY

## 2025-05-06 PROCEDURE — 36415 COLL VENOUS BLD VENIPUNCTURE: CPT | Performed by: OBSTETRICS & GYNECOLOGY

## 2025-05-06 PROCEDURE — 25000003 PHARM REV CODE 250: Performed by: OBSTETRICS & GYNECOLOGY

## 2025-05-06 PROCEDURE — 81003 URINALYSIS AUTO W/O SCOPE: CPT | Performed by: OBSTETRICS & GYNECOLOGY

## 2025-05-06 PROCEDURE — 85025 COMPLETE CBC W/AUTO DIFF WBC: CPT | Performed by: OBSTETRICS & GYNECOLOGY

## 2025-05-06 PROCEDURE — 84112 EVAL AMNIOTIC FLUID PROTEIN: CPT | Performed by: OBSTETRICS & GYNECOLOGY

## 2025-05-06 PROCEDURE — 59025 FETAL NON-STRESS TEST: CPT

## 2025-05-06 PROCEDURE — 71000033 HC RECOVERY, INTIAL HOUR: Performed by: OBSTETRICS & GYNECOLOGY

## 2025-05-06 PROCEDURE — 36004724 HC OB OR TIME LEV III - 1ST 15 MIN: Performed by: OBSTETRICS & GYNECOLOGY

## 2025-05-06 PROCEDURE — 63600175 PHARM REV CODE 636 W HCPCS: Mod: JZ,TB | Performed by: NURSE ANESTHETIST, CERTIFIED REGISTERED

## 2025-05-06 PROCEDURE — 11000001 HC ACUTE MED/SURG PRIVATE ROOM

## 2025-05-06 PROCEDURE — 59514 CESAREAN DELIVERY ONLY: CPT | Mod: AS,AT,, | Performed by: PHYSICIAN ASSISTANT

## 2025-05-06 PROCEDURE — 80074 ACUTE HEPATITIS PANEL: CPT | Performed by: OBSTETRICS & GYNECOLOGY

## 2025-05-06 PROCEDURE — 59514 CESAREAN DELIVERY ONLY: CPT | Mod: AT,,, | Performed by: OBSTETRICS & GYNECOLOGY

## 2025-05-06 PROCEDURE — 87389 HIV-1 AG W/HIV-1&-2 AB AG IA: CPT | Performed by: OBSTETRICS & GYNECOLOGY

## 2025-05-06 PROCEDURE — 63600175 PHARM REV CODE 636 W HCPCS: Performed by: OBSTETRICS & GYNECOLOGY

## 2025-05-06 PROCEDURE — 36004725 HC OB OR TIME LEV III - EA ADD 15 MIN: Performed by: OBSTETRICS & GYNECOLOGY

## 2025-05-06 PROCEDURE — 37000009 HC ANESTHESIA EA ADD 15 MINS: Performed by: OBSTETRICS & GYNECOLOGY

## 2025-05-06 PROCEDURE — 86593 SYPHILIS TEST NON-TREP QUANT: CPT | Performed by: OBSTETRICS & GYNECOLOGY

## 2025-05-06 RX ORDER — DIPHENOXYLATE HYDROCHLORIDE AND ATROPINE SULFATE 2.5; .025 MG/1; MG/1
2 TABLET ORAL EVERY 6 HOURS PRN
Status: DISCONTINUED | OUTPATIENT
Start: 2025-05-06 | End: 2025-05-06

## 2025-05-06 RX ORDER — MORPHINE SULFATE 0.5 MG/ML
INJECTION, SOLUTION EPIDURAL; INTRATHECAL; INTRAVENOUS
Status: DISCONTINUED | OUTPATIENT
Start: 2025-05-06 | End: 2025-05-06

## 2025-05-06 RX ORDER — FAMOTIDINE 10 MG/ML
20 INJECTION, SOLUTION INTRAVENOUS 2 TIMES DAILY
Status: DISCONTINUED | OUTPATIENT
Start: 2025-05-06 | End: 2025-05-06

## 2025-05-06 RX ORDER — ACETAMINOPHEN 325 MG/1
650 TABLET ORAL EVERY 6 HOURS
Status: COMPLETED | OUTPATIENT
Start: 2025-05-06 | End: 2025-05-07

## 2025-05-06 RX ORDER — ACETAMINOPHEN 10 MG/ML
INJECTION, SOLUTION INTRAVENOUS
Status: DISCONTINUED | OUTPATIENT
Start: 2025-05-06 | End: 2025-05-06

## 2025-05-06 RX ORDER — OXYTOCIN-SODIUM CHLORIDE 0.9% IV SOLN 30 UNIT/500ML 30-0.9/5 UT/ML-%
30 SOLUTION INTRAVENOUS ONCE AS NEEDED
Status: DISCONTINUED | OUTPATIENT
Start: 2025-05-06 | End: 2025-05-06

## 2025-05-06 RX ORDER — OXYCODONE AND ACETAMINOPHEN 10; 325 MG/1; MG/1
1 TABLET ORAL EVERY 4 HOURS PRN
Status: DISCONTINUED | OUTPATIENT
Start: 2025-05-07 | End: 2025-05-09 | Stop reason: HOSPADM

## 2025-05-06 RX ORDER — BISACODYL 10 MG/1
10 SUPPOSITORY RECTAL ONCE AS NEEDED
Status: DISCONTINUED | OUTPATIENT
Start: 2025-05-06 | End: 2025-05-09 | Stop reason: HOSPADM

## 2025-05-06 RX ORDER — OXYTOCIN 10 [USP'U]/ML
10 INJECTION, SOLUTION INTRAMUSCULAR; INTRAVENOUS ONCE AS NEEDED
Status: DISCONTINUED | OUTPATIENT
Start: 2025-05-06 | End: 2025-05-09 | Stop reason: HOSPADM

## 2025-05-06 RX ORDER — FENTANYL CITRATE 50 UG/ML
INJECTION, SOLUTION INTRAMUSCULAR; INTRAVENOUS
Status: DISCONTINUED | OUTPATIENT
Start: 2025-05-06 | End: 2025-05-06

## 2025-05-06 RX ORDER — BUPIVACAINE HYDROCHLORIDE 7.5 MG/ML
INJECTION INTRAVENOUS
Status: DISCONTINUED | OUTPATIENT
Start: 2025-05-06 | End: 2025-05-06

## 2025-05-06 RX ORDER — KETOROLAC TROMETHAMINE 30 MG/ML
30 INJECTION, SOLUTION INTRAMUSCULAR; INTRAVENOUS EVERY 6 HOURS
Status: ACTIVE | OUTPATIENT
Start: 2025-05-06 | End: 2025-05-07

## 2025-05-06 RX ORDER — MISOPROSTOL 200 UG/1
800 TABLET ORAL ONCE AS NEEDED
Status: DISCONTINUED | OUTPATIENT
Start: 2025-05-06 | End: 2025-05-06

## 2025-05-06 RX ORDER — OXYTOCIN-SODIUM CHLORIDE 0.9% IV SOLN 30 UNIT/500ML 30-0.9/5 UT/ML-%
95 SOLUTION INTRAVENOUS ONCE AS NEEDED
Status: DISCONTINUED | OUTPATIENT
Start: 2025-05-06 | End: 2025-05-09 | Stop reason: HOSPADM

## 2025-05-06 RX ORDER — SODIUM CITRATE AND CITRIC ACID MONOHYDRATE 334; 500 MG/5ML; MG/5ML
30 SOLUTION ORAL ONCE
Status: COMPLETED | OUTPATIENT
Start: 2025-05-06 | End: 2025-05-06

## 2025-05-06 RX ORDER — OXYTOCIN-SODIUM CHLORIDE 0.9% IV SOLN 30 UNIT/500ML 30-0.9/5 UT/ML-%
30 SOLUTION INTRAVENOUS ONCE AS NEEDED
Status: DISCONTINUED | OUTPATIENT
Start: 2025-05-06 | End: 2025-05-09 | Stop reason: HOSPADM

## 2025-05-06 RX ORDER — PHENYLEPHRINE HYDROCHLORIDE 10 MG/ML
INJECTION INTRAVENOUS
Status: DISCONTINUED | OUTPATIENT
Start: 2025-05-06 | End: 2025-05-06

## 2025-05-06 RX ORDER — MISOPROSTOL 200 UG/1
800 TABLET ORAL ONCE AS NEEDED
Status: DISCONTINUED | OUTPATIENT
Start: 2025-05-06 | End: 2025-05-09 | Stop reason: HOSPADM

## 2025-05-06 RX ORDER — DIPHENOXYLATE HYDROCHLORIDE AND ATROPINE SULFATE 2.5; .025 MG/1; MG/1
2 TABLET ORAL EVERY 6 HOURS PRN
Status: DISCONTINUED | OUTPATIENT
Start: 2025-05-06 | End: 2025-05-09 | Stop reason: HOSPADM

## 2025-05-06 RX ORDER — CEFAZOLIN SODIUM 1 G/3ML
2 INJECTION, POWDER, FOR SOLUTION INTRAMUSCULAR; INTRAVENOUS ONCE
Status: COMPLETED | OUTPATIENT
Start: 2025-05-06 | End: 2025-05-06

## 2025-05-06 RX ORDER — LIDOCAINE HYDROCHLORIDE 10 MG/ML
10 INJECTION, SOLUTION INFILTRATION; PERINEURAL ONCE AS NEEDED
Status: DISCONTINUED | OUTPATIENT
Start: 2025-05-06 | End: 2025-05-06

## 2025-05-06 RX ORDER — OXYTOCIN 10 [USP'U]/ML
10 INJECTION, SOLUTION INTRAMUSCULAR; INTRAVENOUS ONCE AS NEEDED
Status: DISCONTINUED | OUTPATIENT
Start: 2025-05-06 | End: 2025-05-06

## 2025-05-06 RX ORDER — ONDANSETRON HYDROCHLORIDE 2 MG/ML
INJECTION, SOLUTION INTRAVENOUS
Status: DISCONTINUED | OUTPATIENT
Start: 2025-05-06 | End: 2025-05-06

## 2025-05-06 RX ORDER — AMOXICILLIN 250 MG
1 CAPSULE ORAL NIGHTLY PRN
Status: DISCONTINUED | OUTPATIENT
Start: 2025-05-06 | End: 2025-05-09 | Stop reason: HOSPADM

## 2025-05-06 RX ORDER — ONDANSETRON 8 MG/1
8 TABLET, ORALLY DISINTEGRATING ORAL EVERY 8 HOURS PRN
Status: DISCONTINUED | OUTPATIENT
Start: 2025-05-06 | End: 2025-05-06

## 2025-05-06 RX ORDER — OXYTOCIN-SODIUM CHLORIDE 0.9% IV SOLN 30 UNIT/500ML 30-0.9/5 UT/ML-%
95 SOLUTION INTRAVENOUS CONTINUOUS PRN
Status: DISCONTINUED | OUTPATIENT
Start: 2025-05-06 | End: 2025-05-09 | Stop reason: HOSPADM

## 2025-05-06 RX ORDER — DIPHENHYDRAMINE HCL 25 MG
25 CAPSULE ORAL EVERY 4 HOURS PRN
Status: DISCONTINUED | OUTPATIENT
Start: 2025-05-06 | End: 2025-05-09 | Stop reason: HOSPADM

## 2025-05-06 RX ORDER — METHYLERGONOVINE MALEATE 0.2 MG/ML
200 INJECTION INTRAVENOUS ONCE AS NEEDED
Status: DISCONTINUED | OUTPATIENT
Start: 2025-05-06 | End: 2025-05-09 | Stop reason: HOSPADM

## 2025-05-06 RX ORDER — MUPIROCIN 20 MG/G
OINTMENT TOPICAL
Status: DISCONTINUED | OUTPATIENT
Start: 2025-05-06 | End: 2025-05-06

## 2025-05-06 RX ORDER — CALCIUM CARBONATE 200(500)MG
500 TABLET,CHEWABLE ORAL 3 TIMES DAILY PRN
Status: DISCONTINUED | OUTPATIENT
Start: 2025-05-06 | End: 2025-05-06

## 2025-05-06 RX ORDER — TRANEXAMIC ACID 10 MG/ML
1000 INJECTION, SOLUTION INTRAVENOUS EVERY 30 MIN PRN
Status: DISCONTINUED | OUTPATIENT
Start: 2025-05-06 | End: 2025-05-06

## 2025-05-06 RX ORDER — OXYCODONE HYDROCHLORIDE 5 MG/1
10 TABLET ORAL EVERY 4 HOURS PRN
Status: ACTIVE | OUTPATIENT
Start: 2025-05-06 | End: 2025-05-07

## 2025-05-06 RX ORDER — OXYTOCIN-SODIUM CHLORIDE 0.9% IV SOLN 30 UNIT/500ML 30-0.9/5 UT/ML-%
95 SOLUTION INTRAVENOUS CONTINUOUS PRN
Status: DISCONTINUED | OUTPATIENT
Start: 2025-05-06 | End: 2025-05-06

## 2025-05-06 RX ORDER — SODIUM CHLORIDE 0.9 % (FLUSH) 0.9 %
10 SYRINGE (ML) INJECTION
Status: DISCONTINUED | OUTPATIENT
Start: 2025-05-06 | End: 2025-05-09 | Stop reason: HOSPADM

## 2025-05-06 RX ORDER — OXYCODONE AND ACETAMINOPHEN 5; 325 MG/1; MG/1
1 TABLET ORAL EVERY 4 HOURS PRN
Status: DISCONTINUED | OUTPATIENT
Start: 2025-05-07 | End: 2025-05-09 | Stop reason: HOSPADM

## 2025-05-06 RX ORDER — ADHESIVE BANDAGE
30 BANDAGE TOPICAL 2 TIMES DAILY PRN
Status: DISCONTINUED | OUTPATIENT
Start: 2025-05-07 | End: 2025-05-09 | Stop reason: HOSPADM

## 2025-05-06 RX ORDER — CARBOPROST TROMETHAMINE 250 UG/ML
250 INJECTION, SOLUTION INTRAMUSCULAR
Status: DISCONTINUED | OUTPATIENT
Start: 2025-05-06 | End: 2025-05-09 | Stop reason: HOSPADM

## 2025-05-06 RX ORDER — SIMETHICONE 80 MG
1 TABLET,CHEWABLE ORAL EVERY 6 HOURS PRN
Status: DISCONTINUED | OUTPATIENT
Start: 2025-05-06 | End: 2025-05-09 | Stop reason: HOSPADM

## 2025-05-06 RX ORDER — DEXTROSE, SODIUM CHLORIDE, SODIUM LACTATE, POTASSIUM CHLORIDE, AND CALCIUM CHLORIDE 5; .6; .31; .03; .02 G/100ML; G/100ML; G/100ML; G/100ML; G/100ML
INJECTION, SOLUTION INTRAVENOUS CONTINUOUS PRN
Status: DISCONTINUED | OUTPATIENT
Start: 2025-05-06 | End: 2025-05-06

## 2025-05-06 RX ORDER — METHYLERGONOVINE MALEATE 0.2 MG/ML
200 INJECTION INTRAVENOUS ONCE AS NEEDED
Status: DISCONTINUED | OUTPATIENT
Start: 2025-05-06 | End: 2025-05-06

## 2025-05-06 RX ORDER — OXYTOCIN-SODIUM CHLORIDE 0.9% IV SOLN 30 UNIT/500ML 30-0.9/5 UT/ML-%
95 SOLUTION INTRAVENOUS ONCE AS NEEDED
Status: DISCONTINUED | OUTPATIENT
Start: 2025-05-06 | End: 2025-05-06

## 2025-05-06 RX ORDER — DEXAMETHASONE SODIUM PHOSPHATE 4 MG/ML
INJECTION, SOLUTION INTRA-ARTICULAR; INTRALESIONAL; INTRAMUSCULAR; INTRAVENOUS; SOFT TISSUE
Status: DISCONTINUED | OUTPATIENT
Start: 2025-05-06 | End: 2025-05-06

## 2025-05-06 RX ORDER — HYDROCORTISONE 25 MG/G
CREAM TOPICAL 3 TIMES DAILY PRN
Status: DISCONTINUED | OUTPATIENT
Start: 2025-05-06 | End: 2025-05-09 | Stop reason: HOSPADM

## 2025-05-06 RX ORDER — OXYCODONE HYDROCHLORIDE 5 MG/1
5 TABLET ORAL EVERY 4 HOURS PRN
Status: DISPENSED | OUTPATIENT
Start: 2025-05-06 | End: 2025-05-07

## 2025-05-06 RX ORDER — PROCHLORPERAZINE EDISYLATE 5 MG/ML
5 INJECTION INTRAMUSCULAR; INTRAVENOUS EVERY 6 HOURS PRN
Status: DISCONTINUED | OUTPATIENT
Start: 2025-05-06 | End: 2025-05-09 | Stop reason: HOSPADM

## 2025-05-06 RX ORDER — ONDANSETRON HYDROCHLORIDE 2 MG/ML
4 INJECTION, SOLUTION INTRAVENOUS EVERY 6 HOURS PRN
Status: DISPENSED | OUTPATIENT
Start: 2025-05-06 | End: 2025-05-07

## 2025-05-06 RX ORDER — ALBUMIN HUMAN 50 G/1000ML
SOLUTION INTRAVENOUS
Status: DISCONTINUED | OUTPATIENT
Start: 2025-05-06 | End: 2025-05-06

## 2025-05-06 RX ORDER — OXYTOCIN-SODIUM CHLORIDE 0.9% IV SOLN 30 UNIT/500ML 30-0.9/5 UT/ML-%
SOLUTION INTRAVENOUS CONTINUOUS PRN
Status: DISCONTINUED | OUTPATIENT
Start: 2025-05-06 | End: 2025-05-06

## 2025-05-06 RX ORDER — METOCLOPRAMIDE HYDROCHLORIDE 5 MG/ML
INJECTION INTRAMUSCULAR; INTRAVENOUS
Status: DISCONTINUED | OUTPATIENT
Start: 2025-05-06 | End: 2025-05-06

## 2025-05-06 RX ORDER — OXYTOCIN-SODIUM CHLORIDE 0.9% IV SOLN 30 UNIT/500ML 30-0.9/5 UT/ML-%
10 SOLUTION INTRAVENOUS ONCE AS NEEDED
Status: DISCONTINUED | OUTPATIENT
Start: 2025-05-06 | End: 2025-05-06

## 2025-05-06 RX ORDER — SODIUM CHLORIDE, SODIUM LACTATE, POTASSIUM CHLORIDE, CALCIUM CHLORIDE 600; 310; 30; 20 MG/100ML; MG/100ML; MG/100ML; MG/100ML
INJECTION, SOLUTION INTRAVENOUS CONTINUOUS
Status: DISCONTINUED | OUTPATIENT
Start: 2025-05-06 | End: 2025-05-06

## 2025-05-06 RX ORDER — ONDANSETRON 8 MG/1
8 TABLET, ORALLY DISINTEGRATING ORAL EVERY 8 HOURS PRN
Status: DISCONTINUED | OUTPATIENT
Start: 2025-05-06 | End: 2025-05-09 | Stop reason: HOSPADM

## 2025-05-06 RX ORDER — CARBOPROST TROMETHAMINE 250 UG/ML
250 INJECTION, SOLUTION INTRAMUSCULAR
Status: DISCONTINUED | OUTPATIENT
Start: 2025-05-06 | End: 2025-05-06

## 2025-05-06 RX ORDER — DOCUSATE SODIUM 100 MG/1
200 CAPSULE, LIQUID FILLED ORAL 2 TIMES DAILY
Status: DISCONTINUED | OUTPATIENT
Start: 2025-05-06 | End: 2025-05-09 | Stop reason: HOSPADM

## 2025-05-06 RX ORDER — MUPIROCIN 20 MG/G
OINTMENT TOPICAL 2 TIMES DAILY
Status: DISCONTINUED | OUTPATIENT
Start: 2025-05-06 | End: 2025-05-09 | Stop reason: HOSPADM

## 2025-05-06 RX ORDER — SIMETHICONE 80 MG
1 TABLET,CHEWABLE ORAL 4 TIMES DAILY PRN
Status: DISCONTINUED | OUTPATIENT
Start: 2025-05-06 | End: 2025-05-06

## 2025-05-06 RX ORDER — TRANEXAMIC ACID 10 MG/ML
1000 INJECTION, SOLUTION INTRAVENOUS EVERY 30 MIN PRN
Status: DISCONTINUED | OUTPATIENT
Start: 2025-05-06 | End: 2025-05-09 | Stop reason: HOSPADM

## 2025-05-06 RX ADMIN — CEFAZOLIN 2 G: 1 INJECTION, POWDER, FOR SOLUTION INTRAMUSCULAR; INTRAVENOUS; PARENTERAL at 09:05

## 2025-05-06 RX ADMIN — PROCHLORPERAZINE EDISYLATE 5 MG: 5 INJECTION INTRAMUSCULAR; INTRAVENOUS at 02:05

## 2025-05-06 RX ADMIN — MORPHINE SULFATE 0.3 MG: 0.5 INJECTION, SOLUTION EPIDURAL; INTRATHECAL; INTRAVENOUS at 10:05

## 2025-05-06 RX ADMIN — PHENYLEPHRINE HYDROCHLORIDE 200 MCG: 10 INJECTION INTRAVENOUS at 10:05

## 2025-05-06 RX ADMIN — FENTANYL CITRATE 50 MCG: 50 INJECTION, SOLUTION INTRAMUSCULAR; INTRAVENOUS at 11:05

## 2025-05-06 RX ADMIN — DIPHENHYDRAMINE HYDROCHLORIDE 25 MG: 25 CAPSULE ORAL at 01:05

## 2025-05-06 RX ADMIN — ACETAMINOPHEN 1000 MG: 10 INJECTION, SOLUTION INTRAVENOUS at 11:05

## 2025-05-06 RX ADMIN — Medication 95 MILLI-UNITS/MIN: at 11:05

## 2025-05-06 RX ADMIN — MUPIROCIN: 20 OINTMENT TOPICAL at 08:05

## 2025-05-06 RX ADMIN — DOCUSATE SODIUM 200 MG: 100 CAPSULE, LIQUID FILLED ORAL at 08:05

## 2025-05-06 RX ADMIN — DEXTROSE, SODIUM CHLORIDE, SODIUM LACTATE, POTASSIUM CHLORIDE, AND CALCIUM CHLORIDE: 5; .6; .31; .03; .02 INJECTION, SOLUTION INTRAVENOUS at 11:05

## 2025-05-06 RX ADMIN — ALBUMIN (HUMAN) 250 ML: 12.5 SOLUTION INTRAVENOUS at 01:05

## 2025-05-06 RX ADMIN — FENTANYL CITRATE 10 MCG: 0.05 INJECTION, SOLUTION INTRAMUSCULAR; INTRAVENOUS at 10:05

## 2025-05-06 RX ADMIN — ACETAMINOPHEN 650 MG: 325 TABLET ORAL at 05:05

## 2025-05-06 RX ADMIN — ACETAMINOPHEN 650 MG: 325 TABLET ORAL at 11:05

## 2025-05-06 RX ADMIN — ONDANSETRON 4 MG: 2 INJECTION INTRAMUSCULAR; INTRAVENOUS at 07:05

## 2025-05-06 RX ADMIN — ONDANSETRON 4 MG: 2 INJECTION, SOLUTION INTRAMUSCULAR; INTRAVENOUS at 10:05

## 2025-05-06 RX ADMIN — FAMOTIDINE 20 MG: 10 INJECTION, SOLUTION INTRAVENOUS at 09:05

## 2025-05-06 RX ADMIN — KETOROLAC TROMETHAMINE 30 MG: 30 INJECTION, SOLUTION INTRAMUSCULAR; INTRAVENOUS at 05:05

## 2025-05-06 RX ADMIN — SODIUM CITRATE AND CITRIC ACID MONOHYDRATE 30 ML: 500; 334 SOLUTION ORAL at 09:05

## 2025-05-06 RX ADMIN — ALBUMIN (HUMAN) 250 ML: 12.5 SOLUTION INTRAVENOUS at 02:05

## 2025-05-06 RX ADMIN — AZITHROMYCIN 500 MG: 500 INJECTION, POWDER, LYOPHILIZED, FOR SOLUTION INTRAVENOUS at 09:05

## 2025-05-06 RX ADMIN — SODIUM CHLORIDE, SODIUM LACTATE, POTASSIUM CHLORIDE, AND CALCIUM CHLORIDE: .6; .31; .03; .02 INJECTION, SOLUTION INTRAVENOUS at 10:05

## 2025-05-06 RX ADMIN — KETOROLAC TROMETHAMINE 30 MG: 30 INJECTION, SOLUTION INTRAMUSCULAR; INTRAVENOUS at 11:05

## 2025-05-06 RX ADMIN — BUPIVACAINE HYDROCHLORIDE IN DEXTROSE 1.8 ML: 7.5 INJECTION, SOLUTION SUBARACHNOID at 10:05

## 2025-05-06 RX ADMIN — Medication 500 ML/HR: at 11:05

## 2025-05-06 RX ADMIN — FENTANYL CITRATE 40 MCG: 50 INJECTION, SOLUTION INTRAMUSCULAR; INTRAVENOUS at 11:05

## 2025-05-06 RX ADMIN — DEXAMETHASONE SODIUM PHOSPHATE 8 MG: 4 INJECTION, SOLUTION INTRA-ARTICULAR; INTRALESIONAL; INTRAMUSCULAR; INTRAVENOUS; SOFT TISSUE at 11:05

## 2025-05-06 RX ADMIN — MUPIROCIN: 20 OINTMENT TOPICAL at 09:05

## 2025-05-06 RX ADMIN — SODIUM CHLORIDE, SODIUM LACTATE, POTASSIUM CHLORIDE, AND CALCIUM CHLORIDE: .6; .31; .03; .02 INJECTION, SOLUTION INTRAVENOUS at 09:05

## 2025-05-06 RX ADMIN — METOCLOPRAMIDE 10 MG: 5 INJECTION, SOLUTION INTRAMUSCULAR; INTRAVENOUS at 11:05

## 2025-05-06 NOTE — ANESTHESIA PREPROCEDURE EVALUATION
05/06/2025  Ruma Allen is a 29 y.o., female.      Pre-op Assessment    I have reviewed the Patient Summary Reports.     I have reviewed the Nursing Notes. I have reviewed the NPO Status.   I have reviewed the Medications.     Review of Systems  Anesthesia Hx:  No problems with previous Anesthesia                Social:  Non-Smoker, No Alcohol Use       Hematology/Oncology:    Oncology Normal                                   EENT/Dental:  EENT/Dental Normal           Cardiovascular:  Cardiovascular Normal                                              Pulmonary:  Pulmonary Normal                       Renal/:  Renal/ Normal                 Hepatic/GI:  Hepatic/GI Normal                    Musculoskeletal:  Musculoskeletal Normal                Neurological:  Neurology Normal                                      Endocrine:  Endocrine Normal                Physical Exam  General: Well nourished, Cooperative, Alert and Oriented    Airway:  Mallampati: II / II  Mouth Opening: Normal  TM Distance: Normal  Tongue: Normal    Dental:  Intact    Chest/Lungs:  Clear to auscultation, Normal Respiratory Rate    Heart:  Rate: Normal  Rhythm: Regular Rhythm  Sounds: Normal        Anesthesia Plan  Type of Anesthesia, risks & benefits discussed:    Anesthesia Type: Spinal  Intra-op Monitoring Plan: Standard ASA Monitors  Post Op Pain Control Plan: multimodal analgesia and intrathecal opioid  Informed Consent: Informed consent signed with the Patient and all parties understand the risks and agree with anesthesia plan.  All questions answered. Patient consented to blood products? Yes  ASA Score: 2 Emergent  Day of Surgery Review of History & Physical: H&P Update referred to the surgeon/provider.    Ready For Surgery From Anesthesia Perspective.     .

## 2025-05-06 NOTE — HOSPITAL COURSE
ROMplus+; pt consented for Csec; pt no longer desires tubal.    5/7/2025  Doing well.  Tolerating oral intake.  Voiding.  Breast-feeding    5/8/25:  pt doing well, desires d/c tomorrow  5/9/25:  pt to be discharged

## 2025-05-06 NOTE — ADDENDUM NOTE
Addendum  created 05/06/25 1431 by Joel Dillon CRNA    Intraprocedure Meds edited, Orders acknowledged in Narrator

## 2025-05-06 NOTE — L&D DELIVERY NOTE
[facility] - [place] - Labor & Delivery   Section   Operative Note    SUMMARY     Date of Procedure: 2025     Procedure: Procedure(s) (LRB):   SECTION, WITH TUBAL LIGATION (N/A)    Surgeons and Role:     * Karlos Akins MD - Primary    Assisting Surgeon: KIMMIE Delgadillo    Pre-Operative Diagnosis: PPROM @ 35 weeks, history of prior Csection and placenta previa    Post-Operative Diagnosis: same    Anesthesia: Spinal/Epidural    Technical Procedures Used: RLTCS           Description of the Findings of the Procedure: adhesions of uterus to anterior abd wall      Complications: No    Blood Loss: approx 800 cc     Procedure in Detail:  With patient in supine position, the legs are  and Sewell Catheter placed and positioning to supine done. Abdomen prepped with Chloroprep and 3 minute drying time allowed prior to draping of the abdomen. Time out taken with OR team members.  Pfannenstiel Incision made through the skin and the existing scar was resected, transverse fascial incision developed bilaterally, rectus muscles  in the midline and the peritoneum entered. Significant adhesions noted of the right lower uterus to anterior abd wall.  These were taken down with cautery. .  The lower uterine segment and position of the fetus identified.  Low Transverse Incision made through well developed lower uterine segment and extended laterally with blunt dissection. No meconium fluid noted.  Infant delivered from vertex presentation.  Cord clamped after one minute and  handed to attending nurse.  Cord blood taken, placenta delivered.  The uterus was exteriorized.  The uterine incision was closed with with running lock 0 monocryl followed by imbricating layer of 0 monocryl.  Observation for bleeding with suture of any bleeding along the hysterotomy line.   With good hemostasis noted, the anterior pelvis is rinsed with sterile saline.   Right and left adnexa with normal anatomy.     Closure of  the abdomen with 2 0 chromic running of the peritoneum, fascial closure with 1 vicryl in running fashion.  Skin closure with insorb staples.  Wound dressed with aquasil. Sponge, lap, and instrument count correct x 2.  No noted intraoperative complications.  Pt taken to recovery awake and in stable condition.           Specimens:   Specimen (24h ago, onward)      None            Condition: Good    VTE Risk Mitigation (From admission, onward)           Ordered     IP VTE HIGH RISK PATIENT  Once         25 1139     Place sequential compression device  Until discontinued         25 1139                    Disposition: PACU - hemodynamically stable.    Attestation: Good         Delivery Information for Girl Ruma Allen    Birth information:  YOB: 2023   Time of birth: 11:03 AM   Sex: female   Head Delivery Date/Time: 2025 11:03 AM   Delivery type: , Low Transverse   Gestational Age: 35w4d       Delivery Providers    Delivering clinician: Karlos Akins MD   Provider Role    Karlos Akins MD Physician    Alix Foster, RN Registered Nurse    Sarah Nicholas Scrub Person    Helena Poole NNP Nurse Practitioner              Measurements    Weight: 2300 g  Weight (lbs): 5 lb 1.1 oz  Length:          Apgars    Living status: Living  Apgar Component Scores:  1 min.:  5 min.:  10 min.:  15 min.:  20 min.:    Skin color:  1  1       Heart rate:  2  2       Reflex irritability:  2  2       Muscle tone:  1  2       Respiratory effort:  1  2       Total:  7  9       Apgars assigned by: CHIQUITA COYLE NNP         Operative Delivery    Forceps attempted?: No  Vacuum extractor attempted?: No         Shoulder Dystocia    Shoulder dystocia present?: No           Presentation    Presentation: Vertex           Interventions/Resuscitation    Method: Bulb Suctioning, Tactile Stimulation, Deep Suctioning       Cord    Vessels: 3 vessels  Complications: None  Delayed Cord Clamping?:  No  Cord Clamped Date/Time: 2025 11:03 AM  Cord Blood Disposition: Lab  Gases Sent?: No  Stem Cell Collection (by MD): No       Placenta    Placenta delivery date/time: 2025 11:05  Placenta removal: Manual removal  Placenta appearance: Intact  Placenta disposition: Discarded           Labor Events:       labor: Yes     Labor Onset Date/Time:         Dilation Complete Date/Time:         Start Pushing Date/Time:         Start Pushing Date/Time:       Rupture Date/Time: 25        Rupture type:          Fluid Amount:       Fluid Color:                steroids: None     Antibiotics given for GBS: No     Induction: none     Indications for induction:        Augmentation:       Indications for augmentation:       Labor complications:       Additional complications:          Cervical ripening:                     Delivery:      Episiotomy: None     Indication for Episiotomy:       Perineal Lacerations: None Repaired:      Periurethral Laceration:   Repaired:     Labial Laceration:   Repaired:     Sulcus Laceration:   Repaired:     Vaginal Laceration:   Repaired:     Cervical Laceration:   Repaired:     Repair suture: None     Repair # of packets:       Last Value - EBL - Nursing (mL):       Sum - EBL - Nursing (mL): 0     Last Value - EBL - Anesthesia (mL):      Calculated QBL (mL): 957     Running total QBL (mL): 957     Vaginal Sweep Performed: Yes     Surgicount Correct: Yes     Vaginal Packing: No Quantity:       Other providers:       Anesthesia    Method: Other, Spinal          Details (if applicable):  Trial of Labor No    Categorization: Repeat    Priority: Urgent   Indications for : Repeat Section;Placenta Previa   Incision Type: low transverse     Additional  information:  Forceps:    Vacuum:    Breech:    Observed anomalies    Other (Comments):

## 2025-05-06 NOTE — NURSING
0741: Patient came in unit from ED with c/o bleeding and loss of fluid at around 06:30 am this morning. Patient reports she was having lower back pain and contraction that started from yesterday night. +FM. Denies of having any sexual intercourse recently. History and medication reviewed. Gentle examination done, SVE 0.5/30/-2, blood stain seen on gloves during examination, no active fluid seen. ROM plus sent to lab. POC discussed, patient verbalized understanding.  used for communication.       0914: Called Dr. Akins regarding the status of patient, ROM plus result, received order to prepare for .     0930: SCD's placed in both lower limbs. Safe surgical checklist and  patient tracer started.

## 2025-05-06 NOTE — ANESTHESIA POSTPROCEDURE EVALUATION
Anesthesia Post Evaluation    Patient: Ruma Allen    Procedure(s) Performed: Procedure(s) (LRB):   SECTION, WITH TUBAL LIGATION (N/A)    Final Anesthesia Type: spinal      Patient location during evaluation: PACU  Patient participation: Yes- Able to Participate  Level of consciousness: awake and alert  Post-procedure vital signs: reviewed and stable  Pain management: adequate  Airway patency: patent    PONV status at discharge: No PONV  Anesthetic complications: no      Respiratory status: spontaneous ventilation and room air  Hydration status: euvolemic  Follow-up not needed.              Vitals Value Taken Time   BP 94/50 25 14:08   Temp 36.5 °C (97.7 °F) 25 11:45   Pulse 96 25 14:10   Resp 18 25 11:45   SpO2 95 % 25 14:10   Vitals shown include unfiled device data.      No case tracking events are documented in the log.      Pain/Dahiana Score: No data recorded

## 2025-05-06 NOTE — PLAN OF CARE
Problem: Adult Inpatient Plan of Care  Goal: Plan of Care Review  Outcome: Progressing     Problem: Adult Inpatient Plan of Care  Goal: Optimal Comfort and Wellbeing  Outcome: Progressing     Problem: Wound  Goal: Optimal Pain Control and Function  Outcome: Progressing

## 2025-05-06 NOTE — ANESTHESIA PROCEDURE NOTES
Spinal    Diagnosis: previa/intrauterine pregnancy  Patient location during procedure: OR  Start time: 5/6/2025 10:19 AM  Timeout: 5/6/2025 10:18 AM  End time: 5/6/2025 10:21 AM    Staffing  Authorizing Provider: Raman Cotton II, MD  Performing Provider: Raman Cotton II, MD    Staffing  Performed by: Raman Cotton II, MD  Authorized by: Raman Cotton II, MD    Preanesthetic Checklist  Completed: patient identified, IV checked, site marked, risks and benefits discussed, surgical consent, monitors and equipment checked, pre-op evaluation and timeout performed  Spinal Block  Patient position: sitting  Prep: Betadine  Patient monitoring: heart rate, cardiac monitor and continuous pulse ox  Approach: midline  Location: L3-4  Injection technique: single shot  CSF Fluid: clear free-flowing CSF  Needle  Needle type: Tawanda   Needle gauge: 24 G  Needle length: 3.5 in  Additional Documentation: no paresthesia on injection and negative aspiration for heme  Needle localization: anatomical landmarks  Assessment  Sensory level: T4   Dermatomal levels determined by pinch or prick  Ease of block: easy  Patient's tolerance of the procedure: comfortable throughout block and no complaints

## 2025-05-06 NOTE — HPI
29 yr  @ 35 wk 4 d presenting to L&D with PPROM.  Preg also complicated by prior Csec and placenta previa (posterior) as of 25.

## 2025-05-06 NOTE — H&P
[facility] - [place] - Labor & Delivery  Obstetrics  History & Physical    Patient Name: Ruma Allen  MRN: [id number]  Admission Date: 2025  Primary Care Provider: Ladan Ojeda APRN    Subjective:     Principal Problem:<principal problem not specified>    History of Present Illness:  29 yr  @ 35 wk 4 d presenting to L&D with PPROM.  Preg also complicated by prior Csec and placenta previa (posterior) as of 25.    Obstetric HPI:  Patient reports None contractions, active fetal movement, No vaginal bleeding , Yes loss of fluid     This pregnancy has been complicated by placenta previa and prior Csec.    OB History    Para Term  AB Living   3 2 2 0 0 2   SAB IAB Ectopic Multiple Live Births   0 0 0 0 2      # Outcome Date GA Lbr Shaheed/2nd Weight Sex Type Anes PTL Lv   3 Current            2 Term 21    F CS-Unspec   JOSE LUIS   1 Term 19    F Vag-Spont   JOSE LUIS      Obstetric Comments   2 Term 21 F CS-LTranv JOSE LUIS    1 Term 19 F Vag-Spont JOSE LUIS      History reviewed. No pertinent past medical history.  History reviewed. No pertinent surgical history.    PTA Medications   Medication Sig    acetaminophen (TYLENOL) 500 MG tablet Take 1 tablet (500 mg total) by mouth every 6 (six) hours as needed.    sodium chloride (SALINE NASAL) 0.65 % nasal spray 1 spray by Nasal route as needed for Congestion.       Review of patient's allergies indicates:  No Known Allergies     Family History    None       Tobacco Use    Smoking status: Never     Passive exposure: Never    Smokeless tobacco: Never   Substance and Sexual Activity    Alcohol use: Never    Drug use: Never    Sexual activity: Yes     Partners: Male     Birth control/protection: None     Review of Systems   Constitutional: Negative.    Respiratory: Negative.     Cardiovascular: Negative.    Gastrointestinal: Negative.    Endocrine: Negative.    Genitourinary: Negative.    Musculoskeletal: Negative.    Neurological: Negative.     Psychiatric/Behavioral: Negative.     Breast: negative.       Objective:     Vital Signs (Most Recent):  Temp: 98.3 °F (36.8 °C) (25 0800)  Pulse: (!) 115 (25 0955)  Resp: 20 (25 0800)  BP: 111/74 (25 0922)  SpO2: 100 % (25 0954) Vital Signs (24h Range):  Temp:  [98.3 °F (36.8 °C)] 98.3 °F (36.8 °C)  Pulse:  [] 115  Resp:  [20] 20  SpO2:  [100 %] 100 %  BP: (109-118)/(56-74) 111/74     Weight: 73 kg (160 lb 15 oz)  Body mass index is 28.51 kg/m².    FHT: 130, reactive w/ decels Cat 1 (indeterminant)  TOCO:  irreg     Physical Exam:   Constitutional: She is oriented to person, place, and time. She appears well-developed and well-nourished. No distress.    HENT:   Head: Normocephalic and atraumatic.     Neck: No thyromegaly present.     Pulmonary/Chest: Effort normal. No respiratory distress.        Abdominal: Soft. She exhibits no distension and no mass. There is no abdominal tenderness. There is no rebound and no guarding.             Musculoskeletal: Normal range of motion and moves all extremeties. No tenderness.       Neurological: She is alert and oriented to person, place, and time. No cranial nerve deficit. Coordination normal.    Skin: She is not diaphoretic.    Psychiatric: She has a normal mood and affect. Her behavior is normal. Judgment and thought content normal.          Significant Labs:  Lab Results   Component Value Date    [facility]       I have personallly reviewed all pertinent lab results from the last 24 hours.  Assessment/Plan:     29 y.o. female  at 35w4d for:    Pregnancy with 35 completed weeks gestation  Proceed with unscheduled RLTCS    Placenta previa, third trimester  Proceed with unscheduled RLTCS    History of  section complicating pregnancy  Proceed with unscheduled RLTCS     premature rupture of membranes (PPROM) delivered, current hospitalization  Proceed with unscheduled RLTCS        Karlos Aknis  MD  Obstetrics  [facility] - Labor & Delivery

## 2025-05-06 NOTE — SUBJECTIVE & OBJECTIVE
Obstetric HPI:  Patient reports None contractions, active fetal movement, No vaginal bleeding , Yes loss of fluid     This pregnancy has been complicated by placenta previa and prior Csec.    OB History    Para Term  AB Living   3 2 2 0 0 2   SAB IAB Ectopic Multiple Live Births   0 0 0 0 2      # Outcome Date GA Lbr Shaheed/2nd Weight Sex Type Anes PTL Lv   3 Current            2 Term 21    F CS-Unspec   JOSE LUIS   1 Term 19    F Vag-Spont   JOSE LUIS      Obstetric Comments   2 Term 21 F CS-LTranv JOSE LUIS    1 Term 19 F Vag-Spont JOSE LUIS      History reviewed. No pertinent past medical history.  History reviewed. No pertinent surgical history.    PTA Medications   Medication Sig    acetaminophen (TYLENOL) 500 MG tablet Take 1 tablet (500 mg total) by mouth every 6 (six) hours as needed.    sodium chloride (SALINE NASAL) 0.65 % nasal spray 1 spray by Nasal route as needed for Congestion.       Review of patient's allergies indicates:  No Known Allergies     Family History    None       Tobacco Use    Smoking status: Never     Passive exposure: Never    Smokeless tobacco: Never   Substance and Sexual Activity    Alcohol use: Never    Drug use: Never    Sexual activity: Yes     Partners: Male     Birth control/protection: None     Review of Systems   Constitutional: Negative.    Respiratory: Negative.     Cardiovascular: Negative.    Gastrointestinal: Negative.    Endocrine: Negative.    Genitourinary: Negative.    Musculoskeletal: Negative.    Neurological: Negative.    Psychiatric/Behavioral: Negative.     Breast: negative.       Objective:     Vital Signs (Most Recent):  Temp: 98.3 °F (36.8 °C) (25 0800)  Pulse: (!) 115 (25 0955)  Resp: 20 (25 0800)  BP: 111/74 (25 0922)  SpO2: 100 % (25 0954) Vital Signs (24h Range):  Temp:  [98.3 °F (36.8 °C)] 98.3 °F (36.8 °C)  Pulse:  [] 115  Resp:  [20] 20  SpO2:  [100 %] 100 %  BP: (109-118)/(56-74) 111/74     Weight: 73 kg  (160 lb 15 oz)  Body mass index is 28.51 kg/m².    FHT: 130, reactive w/ decels Cat 1 (indeterminant)  TOCO:  irreg     Physical Exam:   Constitutional: She is oriented to person, place, and time. She appears well-developed and well-nourished. No distress.    HENT:   Head: Normocephalic and atraumatic.     Neck: No thyromegaly present.     Pulmonary/Chest: Effort normal. No respiratory distress.        Abdominal: Soft. She exhibits no distension and no mass. There is no abdominal tenderness. There is no rebound and no guarding.             Musculoskeletal: Normal range of motion and moves all extremeties. No tenderness.       Neurological: She is alert and oriented to person, place, and time. No cranial nerve deficit. Coordination normal.    Skin: She is not diaphoretic.    Psychiatric: She has a normal mood and affect. Her behavior is normal. Judgment and thought content normal.          Significant Labs:  Lab Results   Component Value Date    [facility] POS 05/06/2025       I have personallly reviewed all pertinent lab results from the last 24 hours.

## 2025-05-07 PROBLEM — Z3A.35 PREGNANCY WITH 35 COMPLETED WEEKS GESTATION: Status: RESOLVED | Noted: 2025-05-06 | Resolved: 2025-05-07

## 2025-05-07 PROBLEM — O44.03 PLACENTA PREVIA, THIRD TRIMESTER: Status: RESOLVED | Noted: 2025-05-06 | Resolved: 2025-05-07

## 2025-05-07 PROBLEM — O42.919 PRETERM PREMATURE RUPTURE OF MEMBRANES (PPROM) DELIVERED, CURRENT HOSPITALIZATION: Status: RESOLVED | Noted: 2025-05-06 | Resolved: 2025-05-07

## 2025-05-07 PROBLEM — Z98.891 STATUS POST CESAREAN SECTION: Status: ACTIVE | Noted: 2025-05-06

## 2025-05-07 LAB
ABSOLUTE EOSINOPHIL (OHS): 0 K/UL
ABSOLUTE MONOCYTE (OHS): 1.38 K/UL (ref 0.3–1)
ABSOLUTE NEUTROPHIL COUNT (OHS): 14.27 K/UL (ref 1.8–7.7)
BASOPHILS # BLD AUTO: 0.02 K/UL
BASOPHILS NFR BLD AUTO: 0.1 %
ERYTHROCYTE [DISTWIDTH] IN BLOOD BY AUTOMATED COUNT: 15 % (ref 11.5–14.5)
HAV IGM SERPL QL IA: NORMAL
HBV CORE IGM SERPL QL IA: NORMAL
HBV SURFACE AG SERPL QL IA: NORMAL
HCT VFR BLD AUTO: 26 % (ref 37–48.5)
HCV AB SERPL QL IA: NORMAL
HGB BLD-MCNC: 8.7 GM/DL (ref 12–16)
IMM GRANULOCYTES # BLD AUTO: 0.15 K/UL (ref 0–0.04)
IMM GRANULOCYTES NFR BLD AUTO: 0.8 % (ref 0–0.5)
LYMPHOCYTES # BLD AUTO: 2.5 K/UL (ref 1–4.8)
MCH RBC QN AUTO: 29.2 PG (ref 27–31)
MCHC RBC AUTO-ENTMCNC: 33.5 G/DL (ref 32–36)
MCV RBC AUTO: 87 FL (ref 82–98)
NUCLEATED RBC (/100WBC) (OHS): 0 /100 WBC
PLATELET # BLD AUTO: 239 K/UL (ref 150–450)
PMV BLD AUTO: 8.8 FL (ref 9.2–12.9)
RBC # BLD AUTO: 2.98 M/UL (ref 4–5.4)
RELATIVE EOSINOPHIL (OHS): 0 %
RELATIVE LYMPHOCYTE (OHS): 13.6 % (ref 18–48)
RELATIVE MONOCYTE (OHS): 7.5 % (ref 4–15)
RELATIVE NEUTROPHIL (OHS): 78 % (ref 38–73)
WBC # BLD AUTO: 18.32 K/UL (ref 3.9–12.7)

## 2025-05-07 PROCEDURE — 25000003 PHARM REV CODE 250: Performed by: ANESTHESIOLOGY

## 2025-05-07 PROCEDURE — 85025 COMPLETE CBC W/AUTO DIFF WBC: CPT | Performed by: OBSTETRICS & GYNECOLOGY

## 2025-05-07 PROCEDURE — 36415 COLL VENOUS BLD VENIPUNCTURE: CPT | Performed by: OBSTETRICS & GYNECOLOGY

## 2025-05-07 PROCEDURE — 99232 SBSQ HOSP IP/OBS MODERATE 35: CPT | Mod: ,,, | Performed by: OBSTETRICS & GYNECOLOGY

## 2025-05-07 PROCEDURE — 63600175 PHARM REV CODE 636 W HCPCS: Mod: JZ,TB | Performed by: ANESTHESIOLOGY

## 2025-05-07 PROCEDURE — 25000003 PHARM REV CODE 250: Performed by: OBSTETRICS & GYNECOLOGY

## 2025-05-07 PROCEDURE — 11000001 HC ACUTE MED/SURG PRIVATE ROOM

## 2025-05-07 RX ADMIN — DOCUSATE SODIUM 200 MG: 100 CAPSULE, LIQUID FILLED ORAL at 07:05

## 2025-05-07 RX ADMIN — MUPIROCIN: 20 OINTMENT TOPICAL at 07:05

## 2025-05-07 RX ADMIN — ONDANSETRON 4 MG: 2 INJECTION INTRAMUSCULAR; INTRAVENOUS at 01:05

## 2025-05-07 RX ADMIN — OXYCODONE 5 MG: 5 TABLET ORAL at 07:05

## 2025-05-07 RX ADMIN — ACETAMINOPHEN 650 MG: 325 TABLET ORAL at 11:05

## 2025-05-07 RX ADMIN — DOCUSATE SODIUM 200 MG: 100 CAPSULE, LIQUID FILLED ORAL at 09:05

## 2025-05-07 RX ADMIN — MUPIROCIN: 20 OINTMENT TOPICAL at 09:05

## 2025-05-07 RX ADMIN — ACETAMINOPHEN 650 MG: 325 TABLET ORAL at 05:05

## 2025-05-07 RX ADMIN — OXYCODONE AND ACETAMINOPHEN 1 TABLET: 10; 325 TABLET ORAL at 09:05

## 2025-05-07 RX ADMIN — KETOROLAC TROMETHAMINE 30 MG: 30 INJECTION, SOLUTION INTRAMUSCULAR; INTRAVENOUS at 05:05

## 2025-05-07 RX ADMIN — OXYCODONE HYDROCHLORIDE AND ACETAMINOPHEN 1 TABLET: 5; 325 TABLET ORAL at 05:05

## 2025-05-07 NOTE — DISCHARGE INSTRUCTIONS
After a Cesearean Birth    General Discharge Instructions  May follow a regular diet, unless otherwise discussed with physician.  Take showers, not baths until instructed by your doctor.   For the next 5 days, continue to use Hibiclens solution when you shower. Always use a clean towel when drying incision.  Incision dressing should be removed 7 days after surgery. If dressing begins to peel up prior to this day, gently remove.    Activity as tolerated.  No lifting or heavy exercise for 6 weeks, no driving for 2 weeks, no sexual intercourse, douching or tampons for 6 weeks  May return to work/school as discussed with physician  Discuss birth control with physician  Breast care support bra worn at all times  Lactation consultant referral number ( [phone] or [phone])    Call Your Healthcare Provider Right Away If You Have:  A temperature of 100.4°F or higher.  If your blood pressure is over 140/90.  You have difficulty catching your breath or trouble breathing.  Heavy vaginal bleeding, clots, or vaginal discharge with foul odor. (heavier than menses)  Persistent nausea or vomiting.  You gain more than 3 pounds in 3 days.  Severe headaches not relieved by Tylenol (acetaminophen) or Motrin (ibuprofen)  Blurry or double vision, see spots or flashing lights.  Dizziness or fainting.  New onset swelling or worsening of existing swelling.  Burning or pain when you urinate.  No bowel movement for 5 days.  Redness, warmth, swelling, or pain in the lower leg.  Redness, discharge, or pain worse than you had in the hospital.  Burning, pain, red streaks, or lumpy areas in your breasts.  Cracks, blisters, or blood on your nipples.  Feelings of extreme sadness or anxiety, or a feeling that you dont want to be with your baby.  If you have any new or unusual symptoms or have questions or concerns    Incision Care  You will be able to shower and pat the incision dry.  Watch your incision for signs of infection, such as  increasing redness or drainage.  For ease of movement, hold a pillow against the incision when you get up from a lying or sitting position, and when you laugh or cough.  Avoid heavy lifting--nothing heavier than your baby until your doctor instructs you otherwise.     Follow-Up  Schedule a  follow-up exam with your healthcare provider for about 6 weeks after delivery. During this exam, your uterus and vaginal area will be checked. Contact your healthcare provider if you think you or your baby are having any problems.                                                                                                              Instrucciones de ora de lactancia    [facility] Servicios de Apoyo a la Lactancia Materna [phone]     La [facility] de Pediatría recomienda la lactancia materna exclusiva edi los primeros 6 meses de anders y la continuación de la lactancia con la introducción de alimentos complementarios más allá del primer año de anders. La Organización [place] de la [place] y la Academia Estadounidense de Pediatría recomiendan retrasar el uso del biberón y el chupete hasta después de las 4 semanas de edad y la lactancia esté taj establecida. La [facility] de Pediatría recomienda el uso de un chupete a la hora de la siesta y la hora de acostarse, mita frank estrategia de reducción de SMSL, para recién nacidos amamantados solo después de 1 mes de edad y la lactancia materna se ha establecido firmemente.   Alimente al bebé a la primera señal de hambre o comodidad  o Levon a la boca, movimientos de succión  o Buscando o buscando algo para chupar  o No espere a llorar - no es frank señal tardía de hambre; es señal de angustia   Las alimentaciones pueden ser de 8 a 12 veces por 24 horas y no seguirán un horario   Alterne el seno con el que comienza la alimentación o comience con el seno que se siente más lleno   Cambie de seno cuando el bebé se damian  "del seno o se queda dormido   Continúe ofreciéndole los senos hasta que el bebé se berna lleno, ya no dé señales de hambre y permanezca dormido cuando lo coloque boca arriba en la cuna.   Si el bebé tiene sueño y no se despierta para alimentarlo, colóquelo piel con piel vestido con un pañal contra el pecho desnudo de la madre.   Duerma cerca de osborne bebé   El bebé debe colocarse y agarrarse al pecho correctamente  o Pecho contra pecho, mentón en el pecho  o Los labios del bebé están "volteados" hacia afuera  o La boca del bebé está completamente abierta mita un grito  o La succión del bebé debe sentirse mita si estuviera tirando de la madre.  ? El bebé debe beber del pecho:  o Debería oír tragar o tragar saliva edi la alimentación.  o Debería andrea leche en los labios del bebé cuando se damian el pecho  o Alyssa senos deben estar más suaves cuando el bebé termine de mamar  o El bebé debe verse relajado al final de las murray.  o Después del 4to día y osborne leche está en:  o La clary del bebé debe volverse de color amarillo brillante y estar suelta, acuosa y con semillas  o El bebé debe tener al menos 3-4 cacas del tamaño de la hill de osborne mano por día  o El bebé debe tener al menos 6-8 pañales mojados por día  o La orina debe ser de color amarillo zainab  Debe beber cuando tenga sed y comer frank dieta saludable cuando tenga  hambriento.  Jasmine siestas para obtener el descanso que necesitas.  [place] medicamentos y/o jose manuel alcohol solo con el permiso de osborne obstetra  o el pediatra del bebé. También puede llamar al [facility],  ([phone]), de lunes a viernes, de 8 a. m. a 5 p. m., [facility], para aprovechar al ralph  información actualizada basada en evidencia sobre el uso de medicamentos edi  embarazo y lactancia.    El bebé debe ser examinado por un pediatra a los 3-5 días de nacido; a menos que lo ordene antes el pediatra.  Frank vez que le baje la leche, el bebé debe volver al peso de nacimiento a más " tardar entre los 10 y 14 días de nacido.    Si tiene problemas con la lactancia o tiene alguna pregunta sobre la lactancia, llame a los servicios de apoyo a la lactancia de [facility] al [phone] de lunes a viernes de 9 a. m. a 5 p. m.    Recursos para la lactancia:    Bebé Café: [phone]    Liga de La Leche: [phone]    [facility] de Lactancia Materna de la [place] [facility] Baby Café: [email or web address]      Ingurgitación primaria:    Si la leche fluye, aplique calor húmedo o seco en los senos edi aproximadamente 10 minutos antes de cada bobo mita medida de comodidad para facilitar el reflejo de eyección de la leche.    Siga el tratamiento térmico con un masaje de senos para suavizar las áreas duras o con bultos del seno.    Use alimentaciones sin restricciones, frecuentes y efectivas    Despierta al bebé para alimentarlo si es necesario.    Evite la alimentación con chupete y biberón    Extracción manual o bomba de senos hasta el punto de comodidad según sea necesario    Use tratamientos fríos en forma de bolsas de hielo/bolsas de gel/verduras congeladas envueltas en un paño suave y vazquez y aplíquelas en los senos edi aproximadamente 20 minutos después de cada alimentación hasta que se resuelva la congestión.    Use un sostén cómodo y de apoyo.    [place] analgésicos según sea necesario    Use medicamentos antiinflamatorios si los prescribe el médico.Recursos comunitarios para madres en período de lactancia  Centros de lactancia/consultores de lactancia del hospital [facility].......................................................................................................................................................[phone]  [facility]..............................................................................................................................................[phone]  [facility]....................................................................................................................................................... [phone]  [facility].......................................................................................................................................................[phone]    AAPCC (Control de intoxicaciones) ............................................[phone]  PoisonHelp.org  Asesoramiento médico gratuito 24 horas al día, 7 días a la semana, a través de la Línea de Ayuda contrala Intoxicación y la herramienta en línea.  Recursos en línea  [facility] ......................................................................................................ibconline.ca  El recurso en línea del Dr. Hilario Nelson ofrece videos, artículos y hojas informativas.  Coffective .............................................................................................................................................................................coffective.com  Descargue la aplicación móvil gratuita para ayudarle a empezar con la lactancia materna de la mejor manera posible.  Droplet.........................................................................................................................................................................firstdroplets.com  La lactancia materna requiere la máxima atención edi los primeros tarik días después del nacimiento. Droplet anima a los padres a aprovechar esta ventana crítica con técnicas de lactancia eficaces para prevenir los problemas más frecuentes.  [employer]...................................................................................................................................ICONOGRAFICOmedia.org  Videos que enseñan y empoderan a madres y acompañantes.  InfantRisk  Center...........................................................................................................................[phone]  [facility].com  Ofrece información actualizada sobre el uso de medicamentos por parte de las madres edi el embarazo y la lactancia.  Laquita Aceves............................................................................................................................................................................................garth.com  Ofrece información en línea sobre la lactancia y la crianza de los hijos.  La Leche League..................................................................................................................................................lllalmsla.org  llli.org  Grupos de apoyo entre madres con educación, apoyo informativo y ánimo para las mujeres que eric amamantar.  Work and Pump..................................................................................................................................................... workandpump.com  Información sobre lactancia materna para madres trabajadoras.  Recursos de [facility] Breastfeeding [facility]..............................[phone]  louisianabreastfeeding.org  Encuentre apoyo local para la lactancia, incluidos asesores de lactancia, clínicas de Mujeres, Bebés y Niños  (WIC, por jovi siglas en inglés), grupos de apoyo, entre otros.  [place] Breastfeeding Support.....................................................................................LaBreastfeedingSupport.org  Búsqueda por código postal de recursos para la lactancia en osborne keisha.  [facility] for Healthy Babies...........................................................................[phone] 5269225baby.org  Conecta a las madres de [place] y jovi familias con los recursos de khris y embarazo. Disponibles las 24 horas del día, los 7 días de la  semana.  [facility]........................................................................................................................................................[phone]  ldh.la.gov/[facility]  Un programa de nutrición en todo el estado para mujeres embarazadas, en periodo de lactancia y de posparto, bebés y niños (menores de 5 años). Ofrece información sobre alimentos y nutrición. También ofrece apoyo a la lactancia por parte de consejeros pares.  Recursos de la keisha de [place]  [facility] .........................................................................................................................................dcsno.org  Los centros comunitarios están convenientemente ubicados en [place], [place], [place], [place], [place], [place], [place], [place] y [place]. Se ofrecen servicios pediátricos, servicios de khris de la lissette, servicios de WIC y más.  Baby Café ......................................................................................................................................................................babycafeusa.org  Grupos de apoyo a la lactancia gratuitos, informales y sin rossana previa que ofrecen atención médica e intervención profesional en lactancia.  Birthmark Doulas/[facility]...................................................... birthmarkdoulas.com  Clínicas de alimentación infantil con atención espontánea, servicios de lactancia, grupos de apoyo y programas educativos.  Cafe au Lait.................................................................[phone]  [employer].com/groups/[email or web address]  Luis M gratuito de apoyo a la lactancia materna para familias de color.  Healthy Start [place]..................................................[phone] ([place])  [phone] ([place])  Atiende a las mujeres en edad fértil y aborda los problemas samantha.gov/health-department/healthy-start de las  mujeres embarazadas y jovi hijos desde el nacimiento hasta los dos años.  La Lecaldo Hansen.........................................Caden.[employer]/SpokenLayer  Grupos de apoyo presenciales y virtuales entre madres con educación, apoyo informativo y ánimo para las mujeres que eric amamantar.  [facility] [facility] [place] at [facility]....................................[phone]  ...................................................................................................[email or web address].[email or web address]/services/[email or web address]-milk[email or web address]  A pesar de intentarlo, a veces las madres no pueden producir osborne propia leche. Si alguna vez no puede producir la leche suficiente, la leche de donantes es la mejor opción. También se aceptan donantes.  MICHAEL Nesting.....................................................................................................................[phone]  nolanesting.com  Apoyo presencial y virtual a las familias edi el embarazo, el parto y los primeros años de maternidad.

## 2025-05-07 NOTE — SUBJECTIVE & OBJECTIVE
Interval History:   Status post repeat low transverse  section 25    She is doing well this morning. She is tolerating a regular diet without nausea or vomiting. She is voiding spontaneously. She is ambulating. She has passed flatus, and has not a BM. Vaginal bleeding is moderate. She denies fever or chills. Abdominal pain is moderate and controlled with oral medications. She Is breastfeeding. She desires circumcision for her male baby: not applicable.    Objective:     Vital Signs (Most Recent):  Temp: 98.3 °F (36.8 °C) (25)  Pulse: 73 (25)  Resp: 18 (25)  BP: (!) 100/59 (25)  SpO2: 100 % (25) Vital Signs (24h Range):  Temp:  [96.1 °F (35.6 °C)-98.8 °F (37.1 °C)] 98.3 °F (36.8 °C)  Pulse:  [0-117] 73  Resp:  [18-20] 18  SpO2:  [93 %-100 %] 100 %  BP: ()/(46-74) 100/59     Weight: 73 kg (160 lb 15 oz)  Body mass index is 28.51 kg/m².      Intake/Output Summary (Last 24 hours) at 2025 0835  Last data filed at 2025 0601  Gross per 24 hour   Intake 1660 ml   Output 3607 ml   Net -1947 ml         Significant Labs:  Lab Results   Component Value Date    [facility]     Recent Labs   Lab 25  0532   HGB 8.7*   HCT 26.0*       CBC:   Recent Labs   Lab 25  0532   WBC 18.32*   RBC 2.98*   HGB 8.7*   HCT 26.0*      MCV 87   MCH 29.2   MCHC 33.5     I have personallly reviewed all pertinent lab results from the last 24 hours.    Physical Exam:   Constitutional: She appears well-developed and well-nourished. No distress.    HENT:   Head: Normocephalic and atraumatic.    Eyes: EOM are normal.     Cardiovascular:  Normal rate.             Pulmonary/Chest: Effort normal. No respiratory distress.        Abdominal: Soft. She exhibits no distension. There is no abdominal tenderness. There is no rebound and no guarding.   Pfannenstiel incision in AquaCel dressing.  No evidence of active bleeding.  Dressing removed.   Incision is clean, dry, intact.  Healing well without any evidence of infection.               Musculoskeletal: Normal range of motion.       Neurological: She is alert.    Skin: Skin is warm and dry.    Psychiatric: She has a normal mood and affect.       Review of Systems

## 2025-05-07 NOTE — PLAN OF CARE
Problem: Adult Inpatient Plan of Care  Goal: Plan of Care Review  Outcome: Progressing     Problem: Adult Inpatient Plan of Care  Goal: Readiness for Transition of Care  Outcome: Progressing     Problem: Wound  Goal: Optimal Wound Healing  Outcome: Progressing     Problem: Postpartum ( Delivery)  Goal: Optimal Pain Control and Function  Outcome: Progressing

## 2025-05-07 NOTE — PLAN OF CARE
Pt seen by lactation, POC reviewed. Plans to pump every 3 hours to provide EBM while infant is in NICU.

## 2025-05-07 NOTE — PROGRESS NOTES
[place] [facility]  Obstetrics  Postpartum Progress Note    Patient Name: Ruma Allen  MRN: [id number]  Admission Date: 2025  Hospital Length of Stay: 1 days  Attending Physician: Karlos Akins MD  Primary Care Provider: Ladan Ojeda APRN    Subjective:     Principal Problem:Status post  section    Hospital Course:  ROMplus+; pt consented for Csec; pt no longer desires tubal.    2025  Doing well.  Tolerating oral intake.  Voiding.  Breast-feeding    Interval History:   Status post repeat low transverse  section 25    She is doing well this morning. She is tolerating a regular diet without nausea or vomiting. She is voiding spontaneously. She is ambulating. She has passed flatus, and has not a BM. Vaginal bleeding is moderate. She denies fever or chills. Abdominal pain is moderate and controlled with oral medications. She Is breastfeeding. She desires circumcision for her male baby: not applicable.    Objective:     Vital Signs (Most Recent):  Temp: 98.3 °F (36.8 °C) (25)  Pulse: 73 (25)  Resp: 18 (25)  BP: (!) 100/59 (25)  SpO2: 100 % (25) Vital Signs (24h Range):  Temp:  [96.1 °F (35.6 °C)-98.8 °F (37.1 °C)] 98.3 °F (36.8 °C)  Pulse:  [0-117] 73  Resp:  [18-20] 18  SpO2:  [93 %-100 %] 100 %  BP: ()/(46-74) 100/59     Weight: 73 kg (160 lb 15 oz)  Body mass index is 28.51 kg/m².      Intake/Output Summary (Last 24 hours) at 2025 0835  Last data filed at 2025 0601  Gross per 24 hour   Intake 1660 ml   Output 3607 ml   Net -1947 ml         Significant Labs:  Lab Results   Component Value Date    GROUPTRH A POS 2025     Recent Labs   Lab 25  0532   HGB 8.7*   HCT 26.0*       CBC:   Recent Labs   Lab 25  0532   WBC 18.32*   RBC 2.98*   HGB 8.7*   HCT 26.0*      MCV 87   MCH 29.2   MCHC 33.5     I have personallly reviewed all pertinent lab results from the last 24  hours.    Physical Exam:   Constitutional: She appears well-developed and well-nourished. No distress.    HENT:   Head: Normocephalic and atraumatic.    Eyes: EOM are normal.     Cardiovascular:  Normal rate.             Pulmonary/Chest: Effort normal. No respiratory distress.        Abdominal: Soft. She exhibits no distension. There is no abdominal tenderness. There is no rebound and no guarding.   Pfannenstiel incision in AquaCel dressing.  No evidence of active bleeding.  Dressing removed.  Incision is clean, dry, intact.  Healing well without any evidence of infection.               Musculoskeletal: Normal range of motion.       Neurological: She is alert.    Skin: Skin is warm and dry.    Psychiatric: She has a normal mood and affect.       Review of Systems  Assessment/Plan:     29 y.o. female  for:    * Status post  section  Routine postpartum care  Watch vaginal bleeding  Pain control  Lactation assistance  Discharge home once milestones are met.          Disposition: As patient meets milestones, will plan to discharge home.    Emerson Grayson MD  Obstetrics  [facility] [place] [facility]

## 2025-05-07 NOTE — LACTATION NOTE
05/07/25 0935   Maternal Assessment   Breast Density Bilateral:;soft   Areola Bilateral:;dense   Nipples Bilateral:;everted   Equipment Type   Breast Pump Type double electric, hospital grade   Breast Pump Flange Type hard   Breast Pump Flange Size 24 mm   Breast Pumping   Breast Pumping Interventions frequent pumping encouraged   Breast Pumping bilateral breasts pumped until soft;double electric breast pump utilized     Infant in NICU-discussed frequent pumping, every 3 hours, to support adequate milk supply and provide EBM to infant. Donor milk benefits and screening process discussed. Mother agreeable, donor consent signed. nubelohony pump, tubing, collections containers and labels brought to bedside. Discussed proper pump setting of initiation phase. Instructed on proper usage of pump and to adjust suction according to maximum comfort level. Verified appropriate flange fit.  Educated on the frequency and duration of pumping in order to promote and maintain a full milk supply. Pumping schedule written on board and reviewed. Hands on pumping technique reviewed. Instructed on cleaning of breast pump parts. Pt verbalized understanding and appropriate recall for proper milk handling, collection, labeling, storage and transportation.    Pumping initiated at 0935-1 ml collected via syringe, labeled, and brought to NICU.    Azerbaijani Creole  #[id number] used for education.

## 2025-05-08 LAB
BACTERIA UR CULT: NORMAL
RUBV IGG SER-ACNC: 13.1 IU/ML
RUBV IGG SER-IMP: REACTIVE

## 2025-05-08 PROCEDURE — 99232 SBSQ HOSP IP/OBS MODERATE 35: CPT | Mod: ,,, | Performed by: OBSTETRICS & GYNECOLOGY

## 2025-05-08 PROCEDURE — 25000003 PHARM REV CODE 250: Performed by: OBSTETRICS & GYNECOLOGY

## 2025-05-08 PROCEDURE — 11000001 HC ACUTE MED/SURG PRIVATE ROOM

## 2025-05-08 RX ORDER — IBUPROFEN 400 MG/1
800 TABLET, FILM COATED ORAL 3 TIMES DAILY
Status: DISCONTINUED | OUTPATIENT
Start: 2025-05-08 | End: 2025-05-09 | Stop reason: HOSPADM

## 2025-05-08 RX ORDER — ACETAMINOPHEN 325 MG/1
650 TABLET ORAL EVERY 6 HOURS
Status: DISCONTINUED | OUTPATIENT
Start: 2025-05-08 | End: 2025-05-09 | Stop reason: HOSPADM

## 2025-05-08 RX ADMIN — MUPIROCIN: 20 OINTMENT TOPICAL at 07:05

## 2025-05-08 RX ADMIN — Medication 1 CAPSULE: at 11:05

## 2025-05-08 RX ADMIN — IBUPROFEN 800 MG: 400 TABLET ORAL at 11:05

## 2025-05-08 RX ADMIN — IBUPROFEN 800 MG: 400 TABLET ORAL at 07:05

## 2025-05-08 RX ADMIN — DOCUSATE SODIUM 200 MG: 100 CAPSULE, LIQUID FILLED ORAL at 07:05

## 2025-05-08 RX ADMIN — OXYCODONE HYDROCHLORIDE AND ACETAMINOPHEN 1 TABLET: 5; 325 TABLET ORAL at 08:05

## 2025-05-08 RX ADMIN — DOCUSATE SODIUM 200 MG: 100 CAPSULE, LIQUID FILLED ORAL at 08:05

## 2025-05-08 RX ADMIN — MUPIROCIN: 20 OINTMENT TOPICAL at 09:05

## 2025-05-08 RX ADMIN — ACETAMINOPHEN 650 MG: 325 TABLET ORAL at 06:05

## 2025-05-08 RX ADMIN — OXYCODONE AND ACETAMINOPHEN 1 TABLET: 10; 325 TABLET ORAL at 02:05

## 2025-05-08 RX ADMIN — OXYCODONE AND ACETAMINOPHEN 1 TABLET: 10; 325 TABLET ORAL at 12:05

## 2025-05-08 NOTE — SUBJECTIVE & OBJECTIVE
Interval History: POD #2 s/p RLTCS    She is doing well this morning. She is tolerating a regular diet without nausea or vomiting. She is voiding spontaneously. She is ambulating. She has passed flatus, and has not a BM. Vaginal bleeding is mild. She denies fever or chills. Abdominal pain is mild and controlled with oral medications. She Is breastfeeding.       Objective:     Vital Signs (Most Recent):  Temp: 98.7 °F (37.1 °C) (05/08/25 0746)  Pulse: 87 (05/08/25 0746)  Resp: 18 (05/08/25 0823)  BP: (!) 104/58 (05/08/25 0746)  SpO2: 98 % (05/08/25 0746) Vital Signs (24h Range):  Temp:  [97.9 °F (36.6 °C)-98.7 °F (37.1 °C)] 98.7 °F (37.1 °C)  Pulse:  [87-95] 87  Resp:  [16-20] 18  SpO2:  [97 %-100 %] 98 %  BP: (104-119)/(55-76) 104/58     Weight: 73 kg (160 lb 15 oz)  Body mass index is 28.51 kg/m².    No intake or output data in the 24 hours ending 05/08/25 0846      Significant Labs:  Lab Results   Component Value Date    GROUPTRH A POS 05/06/2025    HEPBSAG Non-Reactive 05/06/2025     Recent Labs   Lab 05/07/25  0532   HGB 8.7*   HCT 26.0*       I have personallly reviewed all pertinent lab results from the last 24 hours.    Physical Exam:   Constitutional: She is oriented to person, place, and time. She appears well-developed and well-nourished. No distress.    HENT:   Head: Normocephalic and atraumatic.     Neck: No thyromegaly present.     Pulmonary/Chest: Effort normal. No respiratory distress.        Abdominal: Soft. She exhibits no distension and no mass. There is no abdominal tenderness. There is no rebound and no guarding.             Musculoskeletal: Normal range of motion. No tenderness.       Neurological: She is alert and oriented to person, place, and time. No cranial nerve deficit. Coordination normal.    Skin: She is not diaphoretic.    Psychiatric: She has a normal mood and affect. Her behavior is normal. Judgment and thought content normal.

## 2025-05-08 NOTE — PLAN OF CARE
Problem: Adult Inpatient Plan of Care  Goal: Plan of Care Review  Outcome: Progressing       Problem: Wound  Goal: Optimal Pain Control and Function  Outcome: Progressing     Problem: Wound  Goal: Optimal Wound Healing  Outcome: Progressing     Problem: Postpartum ( Delivery)  Goal: Nausea and Vomiting Relief  Outcome: Progressing

## 2025-05-08 NOTE — PROGRESS NOTES
[facility]  Obstetrics  Postpartum Progress Note    Patient Name: Ruma Allen  MRN: [id number]  Admission Date: 2025  Hospital Length of Stay: 2 days  Attending Physician: Karlos Akins MD  Primary Care Provider: Ladan Ojeda APRN    Subjective:     Principal Problem:Status post  section    Hospital Course:  ROMplus+; pt consented for Csec; pt no longer desires tubal.    2025  Doing well.  Tolerating oral intake.  Voiding.  Breast-feeding    25:  pt doing well, desires d/c tomorrow    Interval History: POD #2 s/p RLTCS    She is doing well this morning. She is tolerating a regular diet without nausea or vomiting. She is voiding spontaneously. She is ambulating. She has passed flatus, and has not a BM. Vaginal bleeding is mild. She denies fever or chills. Abdominal pain is mild and controlled with oral medications. She Is breastfeeding.       Objective:     Vital Signs (Most Recent):  Temp: 98.7 °F (37.1 °C) (25)  Pulse: 87 (25)  Resp: 18 (25)  BP: (!) 104/58 (25)  SpO2: 98 % (25) Vital Signs (24h Range):  Temp:  [97.9 °F (36.6 °C)-98.7 °F (37.1 °C)] 98.7 °F (37.1 °C)  Pulse:  [87-95] 87  Resp:  [16-20] 18  SpO2:  [97 %-100 %] 98 %  BP: (104-119)/(55-76) 104/58     Weight: 73 kg (160 lb 15 oz)  Body mass index is 28.51 kg/m².    No intake or output data in the 24 hours ending 25      Significant Labs:  Lab Results   Component Value Date    GROUPTRH A POS 2025    HEPBSAG Non-Reactive 2025     Recent Labs   Lab 25  0532   HGB 8.7*   HCT 26.0*       I have personallly reviewed all pertinent lab results from the last 24 hours.    Physical Exam:   Constitutional: She is oriented to person, place, and time. She appears well-developed and well-nourished. No distress.    HENT:   Head: Normocephalic and atraumatic.     Neck: No thyromegaly present.     Pulmonary/Chest: Effort normal. No  respiratory distress.        Abdominal: Soft. She exhibits no distension and no mass. There is no abdominal tenderness. There is no rebound and no guarding.             Musculoskeletal: Normal range of motion. No tenderness.       Neurological: She is alert and oriented to person, place, and time. No cranial nerve deficit. Coordination normal.    Skin: She is not diaphoretic.    Psychiatric: She has a normal mood and affect. Her behavior is normal. Judgment and thought content normal.         Assessment/Plan:     29 y.o. female  for:    * Status post  section  Routine postpartum care  Watch vaginal bleeding  Pain control  Lactation assistance  Discharge tomorrow  Fe supplementation        Disposition: As patient meets milestones, will plan to discharge tomorrow.    Karlos Akins MD  Obstetrics  [facility]

## 2025-05-08 NOTE — ASSESSMENT & PLAN NOTE
Routine postpartum care  Watch vaginal bleeding  Pain control  Lactation assistance  Discharge tomorrow  Fe supplementation

## 2025-05-08 NOTE — LACTATION NOTE
05/08/25 0850   Maternal Assessment   Breast Density Bilateral:;soft   Areola Bilateral:;dense   Nipples Bilateral:;everted   Maternal Infant Feeding   Maternal Emotional State assist needed   Equipment Type   Breast Pump Type double electric, hospital grade   Breast Pump Flange Type hard   Breast Pump Flange Size 21 mm   Breast Pumping   Breast Pumping Interventions frequent pumping encouraged   Breast Pumping bilateral breasts pumped until soft;double electric breast pump utilized     Mother with baby in NICU -states pumping yesterday but has not pumped since 10 pm -assistance given to set-up pump -changed flanges to # 21 and mother states still comfortable-reinforced frequent pumping at least 8 times in 24 hours -encouraged mother to hold baby skin to skin in NICU and call for any assistance today

## 2025-05-09 VITALS
SYSTOLIC BLOOD PRESSURE: 114 MMHG | HEIGHT: 63 IN | BODY MASS INDEX: 28.52 KG/M2 | DIASTOLIC BLOOD PRESSURE: 67 MMHG | OXYGEN SATURATION: 100 % | HEART RATE: 84 BPM | WEIGHT: 160.94 LBS | TEMPERATURE: 98 F | RESPIRATION RATE: 20 BRPM

## 2025-05-09 PROCEDURE — 99238 HOSP IP/OBS DSCHRG MGMT 30/<: CPT | Mod: ,,, | Performed by: OBSTETRICS & GYNECOLOGY

## 2025-05-09 PROCEDURE — 25000003 PHARM REV CODE 250: Performed by: OBSTETRICS & GYNECOLOGY

## 2025-05-09 RX ORDER — OXYCODONE AND ACETAMINOPHEN 5; 325 MG/1; MG/1
1 TABLET ORAL EVERY 6 HOURS PRN
Qty: 20 TABLET | Refills: 0 | Status: SHIPPED | OUTPATIENT
Start: 2025-05-09

## 2025-05-09 RX ORDER — IBUPROFEN 800 MG/1
800 TABLET, FILM COATED ORAL 3 TIMES DAILY
Qty: 40 TABLET | Refills: 0 | Status: SHIPPED | OUTPATIENT
Start: 2025-05-09

## 2025-05-09 RX ORDER — B-COMPLEX WITH VITAMIN C
1 TABLET ORAL DAILY
Qty: 90 TABLET | Refills: 1 | Status: SHIPPED | OUTPATIENT
Start: 2025-05-09

## 2025-05-09 RX ADMIN — MUPIROCIN: 20 OINTMENT TOPICAL at 08:05

## 2025-05-09 RX ADMIN — ACETAMINOPHEN 650 MG: 325 TABLET ORAL at 12:05

## 2025-05-09 RX ADMIN — IBUPROFEN 800 MG: 400 TABLET ORAL at 05:05

## 2025-05-09 RX ADMIN — Medication 1 CAPSULE: at 08:05

## 2025-05-09 RX ADMIN — ACETAMINOPHEN 650 MG: 325 TABLET ORAL at 05:05

## 2025-05-09 RX ADMIN — IBUPROFEN 800 MG: 400 TABLET ORAL at 01:05

## 2025-05-09 RX ADMIN — DOCUSATE SODIUM 200 MG: 100 CAPSULE, LIQUID FILLED ORAL at 08:05

## 2025-05-09 NOTE — LACTATION NOTE
05/09/25 1300   Maternal Assessment   Breast Density Bilateral:;full   Areola Bilateral:;dense   Nipples Bilateral:;everted   Maternal Infant Feeding   Maternal Emotional State independent;relaxed   Equipment Type   Breast Pump Type double electric, hospital grade   Breast Pump Flange Type hard   Breast Pump Flange Size 21 mm   Breast Pumping   Breast Pumping Interventions frequent pumping encouraged   Breast Pumping bilateral breasts pumped until soft;double electric breast pump utilized     Mother with baby in NICU -has been pumping -states breasts full this Am and pumped almost 2 ounces from each side -states breasts comfortable after pumping - ready for discharge today -will take Loaner pump home for use until able to get personal pump -review collection, storage and transport of milk to NICU as well as cleaning and sanitation of breast pump parts-reinforced frequent pumping to empty breasts and use of standard pumping mode now that milk is filling in -has Rx for personal pump for home natty -all questions answered and states understanding -AMN Megan Creole  #[id number] used or education

## 2025-05-09 NOTE — ASSESSMENT & PLAN NOTE
Routine postpartum care  Watch vaginal bleeding  Pain control  Lactation assistance  Discharge today  Fe supplementation

## 2025-05-09 NOTE — NURSING
Pt discharged in stable condition.  Pt taken to second floor of garage in wheelchair Pt accompanied by [place] RN and US Zane.

## 2025-05-09 NOTE — SUBJECTIVE & OBJECTIVE
"Interval History: POD #3 s/p RLTCS    She is doing well this morning. She is tolerating a regular diet without nausea or vomiting. She is voiding spontaneously. She is ambulating. She has passed flatus, and has not a BM. Vaginal bleeding is mild. She denies fever or chills. Abdominal pain is mild and controlled with oral medications. She Is breastfeeding.     Objective:     Vital Signs (Most Recent):  Temp: 98 °F (36.7 °C) (05/09/25 0530)  Pulse: 85 (05/09/25 0530)  Resp: 17 (05/09/25 0530)  BP: 118/63 (05/09/25 0530)  SpO2: 99 % (05/09/25 0530) Vital Signs (24h Range):  Temp:  [97.7 °F (36.5 °C)-98.7 °F (37.1 °C)] 98 °F (36.7 °C)  Pulse:  [] 85  Resp:  [17-20] 17  SpO2:  [98 %-100 %] 99 %  BP: ()/(58-71) 118/63     Weight: 73 kg (160 lb 15 oz)  Body mass index is 28.51 kg/m².    No intake or output data in the 24 hours ending 05/09/25 0729      Significant Labs:  Lab Results   Component Value Date    GROUPTRH A POS 05/06/2025    HEPBSAG Non-Reactive 05/06/2025     No results for input(s): "HGB", "HCT" in the last 48 hours.    I have personallly reviewed all pertinent lab results from the last 24 hours.    Physical Exam:   Constitutional: She is oriented to person, place, and time. She appears well-developed and well-nourished. No distress.    HENT:   Head: Normocephalic and atraumatic.     Neck: No thyromegaly present.     Pulmonary/Chest: Effort normal. No respiratory distress.        Abdominal: Soft. She exhibits abdominal incision (wound CDI). She exhibits no distension and no mass. There is no abdominal tenderness. There is no rebound and no guarding.             Musculoskeletal: Normal range of motion and moves all extremeties. No tenderness.       Neurological: She is alert and oriented to person, place, and time. No cranial nerve deficit. Coordination normal.    Skin: She is not diaphoretic.    Psychiatric: She has a normal mood and affect. Her behavior is normal. Judgment and thought content " normal.

## 2025-05-09 NOTE — PLAN OF CARE
Pt stable. VS WDL. Voiding and ambulating independently.  Fundus firm, midline 1 below, scant bleeding.  Aquacel has scant dried drainage.  Pain managed with ibuprofen 800 and tylenol 650.  Tolerating regular diet.  Ambulates independently to visit baby in NICU.  Pt doing well.

## 2025-05-09 NOTE — NURSING
Discharge instructions given per AVS.  Pt verbalized understanding of instructions, when to notify physicians of maternal warning signs and/or present to the ED and to follow up with Dr. Akins on 5/13 at 1110 and June 20 at 0920.  A list of community resources provided to pt per AVS.        Pt has to wait for her ride, who will be here some time around 12.

## 2025-05-09 NOTE — PROGRESS NOTES
"[facility] [place] [facility]  Obstetrics  Postpartum Progress Note    Patient Name: Ruma Allen  MRN: [id number]  Admission Date: 2025  Hospital Length of Stay: 3 days  Attending Physician: Karlos Akins MD  Primary Care Provider: Ladan Ojeda APRN    Subjective:     Principal Problem:Status post  section    Hospital Course:  ROMplus+; pt consented for Csec; pt no longer desires tubal.    2025  Doing well.  Tolerating oral intake.  Voiding.  Breast-feeding    25:  pt doing well, desires d/c tomorrow  25:  pt to be discharged    Interval History: POD #3 s/p RLTCS    She is doing well this morning. She is tolerating a regular diet without nausea or vomiting. She is voiding spontaneously. She is ambulating. She has passed flatus, and has not a BM. Vaginal bleeding is mild. She denies fever or chills. Abdominal pain is mild and controlled with oral medications. She Is breastfeeding.     Objective:     Vital Signs (Most Recent):  Temp: 98 °F (36.7 °C) (25)  Pulse: 85 (25)  Resp: 17 (25)  BP: 118/63 (25)  SpO2: 99 % (25) Vital Signs (24h Range):  Temp:  [97.7 °F (36.5 °C)-98.7 °F (37.1 °C)] 98 °F (36.7 °C)  Pulse:  [] 85  Resp:  [17-20] 17  SpO2:  [98 %-100 %] 99 %  BP: ()/(58-71) 118/63     Weight: 73 kg (160 lb 15 oz)  Body mass index is 28.51 kg/m².    No intake or output data in the 24 hours ending 25 0729      Significant Labs:  Lab Results   Component Value Date    GROUPTRH A POS 2025    HEPBSAG Non-Reactive 2025     No results for input(s): "HGB", "HCT" in the last 48 hours.    I have personallly reviewed all pertinent lab results from the last 24 hours.    Physical Exam:   Constitutional: She is oriented to person, place, and time. She appears well-developed and well-nourished. No distress.    HENT:   Head: Normocephalic and atraumatic.     Neck: No thyromegaly present.     Pulmonary/Chest: " Effort normal. No respiratory distress.        Abdominal: Soft. She exhibits abdominal incision (wound CDI). She exhibits no distension and no mass. There is no abdominal tenderness. There is no rebound and no guarding.             Musculoskeletal: Normal range of motion and moves all extremeties. No tenderness.       Neurological: She is alert and oriented to person, place, and time. No cranial nerve deficit. Coordination normal.    Skin: She is not diaphoretic.    Psychiatric: She has a normal mood and affect. Her behavior is normal. Judgment and thought content normal.         Assessment/Plan:     29 y.o. female  for:    * Status post  section  Routine postpartum care  Watch vaginal bleeding  Pain control  Lactation assistance  Discharge today  Fe supplementation        Disposition: As patient meets milestones, will plan to discharge today.    Karlos Akins MD  Obstetrics  [facility]

## 2025-05-11 ENCOUNTER — LACTATION ENCOUNTER (OUTPATIENT)
Dept: INTENSIVE CARE | Facility: HOSPITAL | Age: 30
End: 2025-05-11

## 2025-05-11 NOTE — DISCHARGE SUMMARY
"[facility] [place] [facility]  Obstetrics  Discharge Summary      Patient Name: Ruma Allen  MRN: [id number]  Admission Date: 2025  Hospital Length of Stay: 3 days  Discharge Date and Time: 2025  2:10 PM  Attending Physician: No att. providers found   Discharging Provider: Karlos Akins MD   Primary Care Provider: Age, ChuyLEIGHTON Lambert    HPI: 29 yr  @ 35 wk 4 d presenting to L&D with PPROM.  Preg also complicated by prior Csec and placenta previa (posterior) as of 25.        Procedure(s) (LRB):   SECTION, WITH TUBAL LIGATION (N/A)     Hospital Course:   ROMplus+; pt consented for Csec; pt no longer desires tubal.    2025  Doing well.  Tolerating oral intake.  Voiding.  Breast-feeding    25:  pt doing well, desires d/c tomorrow  25:  pt to be discharged         Final Active Diagnoses:    Diagnosis Date Noted POA    PRINCIPAL PROBLEM:  Status post  section [Z98.891] 2025 Not Applicable      Problems Resolved During this Admission:    Diagnosis Date Noted Date Resolved POA     premature rupture of membranes (PPROM) delivered, current hospitalization [O42.919] 2025 Yes    Placenta previa, third trimester [O44.03] 2025 Yes    Pregnancy with 35 completed weeks gestation [Z3A.35] 2025 Not Applicable    Delivery of third pregnancy by  section using transverse incision of lower segment of uterus [O82] 2025 No        Significant Diagnostic Studies: Labs: CBC No results for input(s): "WBC", "HGB", "HCT", "PLT" in the last 48 hours.      Feeding Method: both breast and bottle    Immunizations       Date Immunization Status Dose Route/Site Given by    25 1621 MMR Deleted 0.5 mL Subcutaneous/     25 1621 Tdap Deleted 0.5 mL Intramuscular/     25 1323 Hepatitis B, Pediatric/Adolescent Deleted 0.5 mL Intramuscular/             Delivery:    Episiotomy: None   Lacerations: " [facility]  General Surgery Progress Note    Patient: Chuy Traore Date of Service: 10/14/2021   MRN: [id number] Time: 11:34 AM   YOB: 1985 Length of Stay: 2 days     24h events:  Left side abdominal pain improving. No fevers or chills. D/W IR yesterday, plan for aspiration of abscess today, abscess to small for drain.     Review of Systems:  Constitutional: Negative except as documented in history of present illness.  Eye: Negative except as documented in history of present illness.  Ear/Nose/Mouth/Throat: Negative except as documented in history of present illness.  Cardiovascular: Negative except as documented in history of present illness.  Respiratory: Negative except as documented in history of present illness.  Gastrointestinal: Negative except as documented in history of present illness.  Genitourinary: Negative except as documented in history of present illness.  Musculoskeletal: Negative except as documented in history of present illness.  Integumentary: Negative except as documented in history of present illness.  Hematology/Lymphatics: Negative except as documented in history of present illness.  Neurologic: Negative except as documented in history of present illness.  Endocrine: Negative except as documented in history of present illness.  Allergy/Immunologic: Negative except as documented in history of present illness.  Psychiatric: Negative except as documented in history of present illness.     Intake/Output:  Date 10/13/21 0700 - 10/14/21 0659 10/14/21 0700 - 10/15/21 0659   Shift 1860-2265 4871-5173 3224-9152 24 Hour Total 1225-6750 9818-0726 8477-2458 24 Hour Total   INTAKE   P.O.     240   240   I.V. 600 999.8 754.1 2353.9       IV Piggyback 200   200       Shift Total 800 999.8 754.1 2553.9 240   240   OUTPUT   Shift Total           Weight (kg) 75.9 75.9 75.9 75.9 75.9 75.9 75.9 75.9       Vitals:    Vital Last Value 24 Hour Range   Temperature 97.5 °F (36.4  None   Repair suture: None   Repair # of packets:     Blood loss (ml):       Birth information:  YOB: 2023   Time of birth: 11:03 AM   Sex: female   Delivery type: , Low Transverse   Gestational Age: 35w4d     Measurements    Weight: 2300 g  Weight (lbs): 5 lb 1.1 oz  Length:          Delivery Clinician: Delivery Providers    Delivering clinician: Karlos Akins MD   Provider Role    Karlos Akins MD Physician    Alix Foster, RN Registered Nurse    Sarah Nicholas Scrub Person    Helena Poole NNP Nurse Practitioner             Additional  information:  Forceps:    Vacuum:    Breech:    Observed anomalies      Living?:     Apgars    Living status: Living  Apgar Component Scores:  1 min.:  5 min.:  10 min.:  15 min.:  20 min.:    Skin color:  1  1       Heart rate:  2  2       Reflex irritability:  2  2       Muscle tone:  1  2       Respiratory effort:  1  2       Total:  7  9       Apgars assigned by: CHIQUITA COYLE NNP         Placenta: Delivered:       appearance  Pending Diagnostic Studies:       None            Discharged Condition: good    Disposition: Home or Self Care    Follow Up:   Follow-up Information       Karlos Akins MD Follow up in 1 week(s).    Specialties: Obstetrics, Obstetrics and Gynecology  Why: For wound re-check  Contact information:  [address] [place] [address] [phone]                           Patient Instructions:      BREAST PUMP FOR HOME USE     Order Specific Question Answer Comments   Type of pump: Electric    Weight: 73 kg (160 lb 15 oz)    Length of need (1-99 months): 99      Diet Adult Regular     Pelvic Rest     Lifting restrictions     Notify your health care provider if you experience any of the following:  temperature >100.4     Notify your health care provider if you experience any of the following:  persistent nausea and vomiting or diarrhea     Notify your health care provider if you experience any of the  °C) (10/14/21 0749) Temp  Min: 97.5 °F (36.4 °C)  Max: 99.7 °F (37.6 °C)   Pulse 83 (10/14/21 0749) Pulse  Min: 83  Max: 89   Respiratory 12 (10/14/21 0749) Resp  Min: 12  Max: 12   Non-Invasive  Blood Pressure 135/80 (10/14/21 0749) BP  Min: 117/76  Max: 135/80   Pulse Oximetry 97 % (10/14/21 0749) SpO2  Min: 97 %  Max: 98 %   Arterial   Blood Pressure   No data recorded       Physical Exam:  General: No acute distress, awake, appears stated age  Head: Normocephalic, atraumatic  Eyes: Conjunctivae normal.   ENT: MM moist.   Neck: Supple, soft  Chest: No lesions, no ecchymosis, nontender  Lungs: Clear breath sounds, unlabored breathing, no wheeze.  Heart: Sinus rhythm, regular rate, no murmur  Abdomen: Non distended. Soft. Tenderness to LLQ without rebounding, rigidity or guarding.   Genitourinary: normal anatomy  Extremities: Warm and well perfused, no swelling  Neurologic: A&Ox3, GCS 15, no gross deficits.  Psychiatric: normal affect and mood        Labs:  Recent Labs   Lab 10/14/21  0604   WBC 7.6   RBC 4.66   HGB 13.9   HCT 41.5        Recent Labs   Lab 10/14/21  0604 10/12/21  1429   SODIUM 136 133*   POTASSIUM 4.0 3.7   CHLORIDE 105 104   CO2 26 27   BUN 7 13   CREATININE 0.89 0.86   GLUCOSE 111* 110*   CALCIUM 8.8 9.0     Recent Labs   Lab 10/14/21  0604 10/12/21  1429   SODIUM 136 133*   POTASSIUM 4.0 3.7   CHLORIDE 105 104   CO2 26 27   BUN 7 13   CREATININE 0.89 0.86   CALCIUM 8.8 9.0   ALBUMIN  --  3.8   BILIRUBIN  --  1.3*   ALKPT  --  88   GPT  --  41   AST  --  18   GLUCOSE 111* 110*     Microbiology Results     None            Radiology:  No results found.      Assessment/Plan:  36 year old year-old male admitted with perforated diverticulitis with abscess.      - Clinically improved. No fevers. White count normal. LLQ pain improved.   - IR consulted to evaluate for aspiration of abscess. For culture. Post procedure okay for clear liquids.   - Continue IV antibiotics: Zosyn.   - Patient aware  following:  severe uncontrolled pain     Notify your health care provider if you experience any of the following:  redness, tenderness, or signs of infection (pain, swelling, redness, odor or green/yellow discharge around incision site)     Notify your health care provider if you experience any of the following:  difficulty breathing or increased cough     Notify your health care provider if you experience any of the following:  severe persistent headache     Notify your health care provider if you experience any of the following:  persistent dizziness, light-headedness, or visual disturbances     Notify your health care provider if you experience any of the following:  increased confusion or weakness     Leave dressing on - Keep it clean, dry, and intact until clinic visit     Weight bearing restrictions (specify):     Medications:  Discharge Medication List as of 5/9/2025  9:57 AM        START taking these medications    Details   ibuprofen (ADVIL,MOTRIN) 800 MG tablet Take 1 tablet (800 mg total) by mouth 3 (three) times daily., Starting Fri 5/9/2025, Normal      iron polysaccharide complex, vit c-sa (FERREX 150 PLUS) 150-50-50 mg Cap capsule Take 1 capsule by mouth once daily., Starting Fri 5/9/2025, Normal      oxyCODONE-acetaminophen (PERCOCET) 5-325 mg per tablet Take 1 tablet by mouth every 6 (six) hours as needed for Pain., Starting Fri 5/9/2025, Normal      prenatal vit no.130-iron-folic (PRENATAL VITAMIN) 27 mg iron- 800 mcg Tab Take 1 tablet by mouth once daily., Starting Fri 5/9/2025, Normal           STOP taking these medications       acetaminophen (TYLENOL) 500 MG tablet Comments:   Reason for Stopping:         sodium chloride (SALINE NASAL) 0.65 % nasal spray Comments:   Reason for Stopping:               Karlos Akins MD  Obstetrics  [facility]     if continues to improve with medical management will require colonoscopy as outpatient once acute issues resolves.

## 2025-05-11 NOTE — LACTATION NOTE
This note was copied from a baby's chart.     05/11/25 1240   Maternal Assessment   Breast Density Bilateral:;filling   Areola Bilateral:;elastic   Nipples Bilateral:;everted   Maternal Infant Feeding   Maternal Emotional State relaxed;independent   Infant Positioning cross-cradle   Signs of Milk Transfer audible swallow;breasts soften with feeding   Pain with Feeding no   Latch Assistance yes   Breast Pumping   Breast Pumping Interventions frequent pumping encouraged     Called to bedside, mother at bedside, bedside nurse stated that she thought mother isn't producing much.  There is enough EBM for about 24 hours. Using  #[id number], I asked mother how pumping was going.  Stated that it is going well.  I encouraged her to pump at least 8 times in 24 hours and to stay hydrated. Explained that she can pump at whatever time is convenient for her but at least 8 times in 24 hours.  Stated that she thought she could only pump before she came to the hospital.  I reviewed how to pump, 8 or more times in 24 hours, and how to store her EBM to bring it in to the hospital.  Information was reinforced several times and patient verbalized understanding.  Mother changed infant's diaper and was going to place her skin to skin but baby was fussy and she then placed infant to the left breast and infant latched on with minimal assistance from me.  I only adjusted the infant to face the breast.  Infant did suck deeply for about 10 minutes.

## 2025-05-12 ENCOUNTER — LACTATION ENCOUNTER (OUTPATIENT)
Dept: INTENSIVE CARE | Facility: HOSPITAL | Age: 30
End: 2025-05-12

## 2025-05-12 NOTE — LACTATION NOTE
This note was copied from a baby's chart.     05/12/25 1104   Maternal Assessment   Breast Density Bilateral:;full   Areola Bilateral:;elastic   Nipples Bilateral:;everted   Maternal Infant Feeding   Maternal Emotional State independent;relaxed;assist needed   Infant Positioning cradle   Signs of Milk Transfer audible swallow;infant jaw motion present   Pain with Feeding no   Latch Assistance yes     Mother here for feeding and baby aroused now -baby latches easily with strong sucking and swallows noted -mother's breasts full but comfortable this morning -pumping going well at home and brought in many bottles of milk -encouraged breastfeed when here and continued frequent pumping at home

## 2025-05-13 ENCOUNTER — OFFICE VISIT (OUTPATIENT)
Dept: OBSTETRICS AND GYNECOLOGY | Facility: CLINIC | Age: 30
End: 2025-05-13
Payer: MEDICAID

## 2025-05-13 VITALS
DIASTOLIC BLOOD PRESSURE: 72 MMHG | SYSTOLIC BLOOD PRESSURE: 116 MMHG | BODY MASS INDEX: 27.34 KG/M2 | WEIGHT: 154.31 LBS

## 2025-05-13 DIAGNOSIS — Z09 POSTOP CHECK: Primary | ICD-10-CM

## 2025-05-13 DIAGNOSIS — Z98.891 S/P CESAREAN SECTION: ICD-10-CM

## 2025-05-13 PROCEDURE — 1159F MED LIST DOCD IN RCRD: CPT | Mod: CPTII,,, | Performed by: OBSTETRICS & GYNECOLOGY

## 2025-05-13 PROCEDURE — 99212 OFFICE O/P EST SF 10 MIN: CPT | Mod: PBBFAC | Performed by: OBSTETRICS & GYNECOLOGY

## 2025-05-13 PROCEDURE — 3078F DIAST BP <80 MM HG: CPT | Mod: CPTII,,, | Performed by: OBSTETRICS & GYNECOLOGY

## 2025-05-13 PROCEDURE — 99999 PR PBB SHADOW E&M-EST. PATIENT-LVL II: CPT | Mod: PBBFAC,,, | Performed by: OBSTETRICS & GYNECOLOGY

## 2025-05-13 PROCEDURE — 99024 POSTOP FOLLOW-UP VISIT: CPT | Mod: ,,, | Performed by: OBSTETRICS & GYNECOLOGY

## 2025-05-13 PROCEDURE — 3074F SYST BP LT 130 MM HG: CPT | Mod: CPTII,,, | Performed by: OBSTETRICS & GYNECOLOGY

## 2025-05-13 NOTE — PROGRESS NOTES
Cc:  wound check    HPI:  29 yr G3 now  who had RLTCS done at 35 weeks for PPROM on 25 presents today for wound check.  Pt denies any c/o and feels well.    Vitals:    25 1316   BP: 116/72   Weight: 70 kg (154 lb 5.2 oz)     Abd:  soft NTND  Wound:  aquacel dressing removed and wound appears to be healing well, no evidence of infection.    Assessment/Plan    1. Postop check    2. S/P  section      F/u 5 wks for pp exam

## 2025-05-17 ENCOUNTER — PATIENT MESSAGE (OUTPATIENT)
Dept: OBSTETRICS AND GYNECOLOGY | Facility: HOSPITAL | Age: 30
End: 2025-05-17
Payer: MEDICAID

## 2025-05-18 ENCOUNTER — TELEPHONE (OUTPATIENT)
Dept: LACTATION | Facility: CLINIC | Age: 30
End: 2025-05-18
Payer: MEDICAID

## 2025-05-18 NOTE — TELEPHONE ENCOUNTER
Lactation Follow Up Call - Call using language line and was unable to contact patient or leave message.

## 2025-05-19 ENCOUNTER — TELEPHONE (OUTPATIENT)
Dept: LACTATION | Facility: CLINIC | Age: 30
End: 2025-05-19
Payer: MEDICAID

## 2025-07-05 ENCOUNTER — HOSPITAL ENCOUNTER (EMERGENCY)
Facility: HOSPITAL | Age: 30
Discharge: HOME | End: 2025-07-05
Attending: EMERGENCY MEDICINE
Payer: MEDICAID

## 2025-07-05 VITALS
HEART RATE: 81 BPM | RESPIRATION RATE: 18 BRPM | SYSTOLIC BLOOD PRESSURE: 108 MMHG | OXYGEN SATURATION: 100 % | TEMPERATURE: 98 F | DIASTOLIC BLOOD PRESSURE: 73 MMHG | WEIGHT: 154 LBS | BODY MASS INDEX: 27.28 KG/M2

## 2025-07-05 DIAGNOSIS — Z51.89 VISIT FOR WOUND CHECK: Primary | ICD-10-CM

## 2025-07-05 DIAGNOSIS — L03.311 CELLULITIS OF ABDOMINAL WALL: ICD-10-CM

## 2025-07-05 LAB
ABSOLUTE EOSINOPHIL (OHS): 0.12 K/UL
ABSOLUTE MONOCYTE (OHS): 0.49 K/UL (ref 0.3–1)
ABSOLUTE NEUTROPHIL COUNT (OHS): 7.42 K/UL (ref 1.8–7.7)
ALBUMIN SERPL BCP-MCNC: 4.1 G/DL (ref 3.5–5.2)
ALLENS TEST: NORMAL
ALP SERPL-CCNC: 69 UNIT/L (ref 40–150)
ALT SERPL W/O P-5'-P-CCNC: 33 UNIT/L (ref 10–44)
ANION GAP (OHS): 10 MMOL/L (ref 8–16)
ANION GAP SERPL CALC-SCNC: 16 MMOL/L (ref 8–16)
AST SERPL-CCNC: 22 UNIT/L (ref 11–45)
B-HCG UR QL: NEGATIVE
BASOPHILS # BLD AUTO: 0.03 K/UL
BASOPHILS NFR BLD AUTO: 0.2 %
BILIRUB SERPL-MCNC: 0.4 MG/DL (ref 0.1–1)
BUN SERPL-MCNC: 12 MG/DL (ref 6–20)
BUN SERPL-MCNC: 12 MG/DL (ref 6–30)
CALCIUM SERPL-MCNC: 8.9 MG/DL (ref 8.7–10.5)
CHLORIDE SERPL-SCNC: 105 MMOL/L (ref 95–110)
CHLORIDE SERPL-SCNC: 108 MMOL/L (ref 95–110)
CO2 SERPL-SCNC: 20 MMOL/L (ref 23–29)
CREAT SERPL-MCNC: 0.8 MG/DL (ref 0.5–1.4)
CREAT SERPL-MCNC: 0.8 MG/DL (ref 0.5–1.4)
CTP QC/QA: YES
ERYTHROCYTE [DISTWIDTH] IN BLOOD BY AUTOMATED COUNT: 14.4 % (ref 11.5–14.5)
GFR SERPLBLD CREATININE-BSD FMLA CKD-EPI: >60 ML/MIN/1.73/M2
GLUCOSE SERPL-MCNC: 105 MG/DL (ref 70–110)
GLUCOSE SERPL-MCNC: 108 MG/DL (ref 70–110)
HCT VFR BLD AUTO: 35.8 % (ref 37–48.5)
HCT VFR BLD CALC: 38 %PCV (ref 36–54)
HGB BLD-MCNC: 11.7 GM/DL (ref 12–16)
IMM GRANULOCYTES # BLD AUTO: 0.04 K/UL (ref 0–0.04)
IMM GRANULOCYTES NFR BLD AUTO: 0.3 % (ref 0–0.5)
LACTATE SERPL-SCNC: 0.9 MMOL/L (ref 0.5–2.2)
LYMPHOCYTES # BLD AUTO: 4.26 K/UL (ref 1–4.8)
MCH RBC QN AUTO: 27 PG (ref 27–31)
MCHC RBC AUTO-ENTMCNC: 32.7 G/DL (ref 32–36)
MCV RBC AUTO: 83 FL (ref 82–98)
NUCLEATED RBC (/100WBC) (OHS): 0 /100 WBC
PLATELET # BLD AUTO: 443 K/UL (ref 150–450)
PMV BLD AUTO: 8.4 FL (ref 9.2–12.9)
POC IONIZED CALCIUM: 1.23 MMOL/L (ref 1.06–1.42)
POC TCO2 (MEASURED): 26 MMOL/L (ref 23–29)
POTASSIUM BLD-SCNC: 4 MMOL/L (ref 3.5–5.1)
POTASSIUM SERPL-SCNC: 4 MMOL/L (ref 3.5–5.1)
PROT SERPL-MCNC: 8.4 GM/DL (ref 6–8.4)
RBC # BLD AUTO: 4.34 M/UL (ref 4–5.4)
RELATIVE EOSINOPHIL (OHS): 1 %
RELATIVE LYMPHOCYTE (OHS): 34.5 % (ref 18–48)
RELATIVE MONOCYTE (OHS): 4 % (ref 4–15)
RELATIVE NEUTROPHIL (OHS): 60 % (ref 38–73)
SAMPLE: NORMAL
SITE: NORMAL
SODIUM BLD-SCNC: 141 MMOL/L (ref 136–145)
SODIUM SERPL-SCNC: 138 MMOL/L (ref 136–145)
WBC # BLD AUTO: 12.36 K/UL (ref 3.9–12.7)

## 2025-07-05 PROCEDURE — 96361 HYDRATE IV INFUSION ADD-ON: CPT

## 2025-07-05 PROCEDURE — 80053 COMPREHEN METABOLIC PANEL: CPT

## 2025-07-05 PROCEDURE — 85014 HEMATOCRIT: CPT

## 2025-07-05 PROCEDURE — 25000003 PHARM REV CODE 250

## 2025-07-05 PROCEDURE — 99900035 HC TECH TIME PER 15 MIN (STAT)

## 2025-07-05 PROCEDURE — 82330 ASSAY OF CALCIUM: CPT

## 2025-07-05 PROCEDURE — 82565 ASSAY OF CREATININE: CPT

## 2025-07-05 PROCEDURE — 25500020 PHARM REV CODE 255: Performed by: EMERGENCY MEDICINE

## 2025-07-05 PROCEDURE — 96360 HYDRATION IV INFUSION INIT: CPT

## 2025-07-05 PROCEDURE — 83605 ASSAY OF LACTIC ACID: CPT

## 2025-07-05 PROCEDURE — 84132 ASSAY OF SERUM POTASSIUM: CPT

## 2025-07-05 PROCEDURE — 84295 ASSAY OF SERUM SODIUM: CPT

## 2025-07-05 PROCEDURE — 81025 URINE PREGNANCY TEST: CPT

## 2025-07-05 PROCEDURE — 85025 COMPLETE CBC W/AUTO DIFF WBC: CPT

## 2025-07-05 PROCEDURE — 99285 EMERGENCY DEPT VISIT HI MDM: CPT | Mod: 25

## 2025-07-05 PROCEDURE — 82962 GLUCOSE BLOOD TEST: CPT

## 2025-07-05 RX ORDER — CLINDAMYCIN HYDROCHLORIDE 150 MG/1
450 CAPSULE ORAL 3 TIMES DAILY
Qty: 63 CAPSULE | Refills: 0 | Status: SHIPPED | OUTPATIENT
Start: 2025-07-05 | End: 2025-07-12

## 2025-07-05 RX ORDER — SODIUM CHLORIDE 9 MG/ML
1000 INJECTION, SOLUTION INTRAVENOUS
Status: COMPLETED | OUTPATIENT
Start: 2025-07-05 | End: 2025-07-05

## 2025-07-05 RX ADMIN — IOHEXOL 75 ML: 350 INJECTION, SOLUTION INTRAVENOUS at 06:07

## 2025-07-05 RX ADMIN — SODIUM CHLORIDE 1000 ML: 9 INJECTION, SOLUTION INTRAVENOUS at 05:07

## 2025-07-05 NOTE — ED PROVIDER NOTES
Encounter Date: 2025       History     Chief Complaint   Patient presents with    Wound Check     Pt reports having a  2 months ago and states she has an opening to the incision site that has white drainage coming from it. Pt denies fever.     Patient is a 29-year-old female  who presents to the emergency department for evaluation of pain and drainage from  scar x 2 days.  Reports white drainage.  Reports  was 2 months ago.  Upon chart review, patient had RLTCS with Dr. Akins.  States he has been doing well until noticing the drainage.  States when she pressed on the area a good amount came out.  She denies fever, chills, nausea, vomiting.  No urinary symptoms.    The history is provided by the patient.     Review of patient's allergies indicates:  No Known Allergies  History reviewed. No pertinent past medical history.  Past Surgical History:   Procedure Laterality Date     SECTION WITH TUBAL LIGATION N/A 2025    Procedure:  SECTION, WITH TUBAL LIGATION;  Surgeon: Karlos Akins MD;  Location: [facility] L&D OR;  Service: OB/GYN;  Laterality: N/A;     No family history on file.  Social History[1]  Review of Systems   Constitutional:  Negative for chills and fever.   Gastrointestinal:  Negative for nausea and vomiting.   Skin:  Positive for wound.   Neurological:  Negative for numbness.       Physical Exam     Initial Vitals [25 1554]   BP Pulse Resp Temp SpO2   (!) 102/58 89 18 98.4 °F (36.9 °C) 98 %      MAP       --         Physical Exam    Nursing note and vitals reviewed.  Constitutional: She appears well-developed and well-nourished.   HENT:   Head: Normocephalic and atraumatic.   Right Ear: External ear normal.   Left Ear: External ear normal.   Neck: Carotid bruit is not present.   Normal range of motion.  Cardiovascular:  Normal rate, regular rhythm, normal heart sounds and intact distal pulses.     Exam reveals no gallop and no friction rub.        No murmur heard.  Pulmonary/Chest: Breath sounds normal. No respiratory distress. She has no wheezes. She has no rhonchi. She has no rales.   Abdominal: Abdomen is soft. Bowel sounds are normal. She exhibits no distension. There is no abdominal tenderness.   RLTCS scar in place.  There is mild warmth, overlying tenderness.  Scant white drainage to the left side of the scar. There is no rebound and no guarding.   Musculoskeletal:         General: Normal range of motion.      Cervical back: Normal range of motion.     Neurological: She is alert and oriented to person, place, and time. GCS score is 15. GCS eye subscore is 4. GCS verbal subscore is 5. GCS motor subscore is 6.   Psychiatric: She has a normal mood and affect.         ED Course   Procedures  Labs Reviewed   COMPREHENSIVE METABOLIC PANEL - Abnormal       Result Value    Sodium 138      Potassium 4.0      Chloride 108      CO2 20 (*)     Glucose 108      BUN 12      Creatinine 0.8      Calcium 8.9      Protein Total 8.4      Albumin 4.1      Bilirubin Total 0.4      ALP 69      AST 22      ALT 33      Anion Gap 10      eGFR >60     CBC WITH DIFFERENTIAL - Abnormal    WBC 12.36      RBC 4.34      HGB 11.7 (*)     HCT 35.8 (*)     MCV 83      MCH 27.0      MCHC 32.7      RDW 14.4      Platelet Count 443      MPV 8.4 (*)     Nucleated RBC 0      Neut % 60.0      Lymph % 34.5      Mono % 4.0      Eos % 1.0      Basophil % 0.2      Imm Grans % 0.3      Neut # 7.42      Lymph # 4.26      Mono # 0.49      Eos # 0.12      Baso # 0.03      Imm Grans # 0.04     LACTIC ACID, PLASMA - Normal    Lactic Acid Level 0.9      Narrative:     Falsely low lactic acid results can be found in samples containing >=13.0 mg/dL total bilirubin and/or >=3.5 mg/dL direct bilirubin.    CBC W/ AUTO DIFFERENTIAL    Narrative:     The following orders were created for panel order CBC auto differential.  Procedure                               Abnormality         Status                      ---------                               -----------         ------                     CBC with Differential[[id number]]       Abnormal            Final result                 Please view results for these tests on the individual orders.   POCT URINE PREGNANCY    POC Preg Test, Ur Negative       Acceptable Yes     ISTAT PROCEDURE    POC Glucose 105      POC BUN 12      POC Creatinine 0.8      POC Sodium 141      POC Potassium 4.0      POC Chloride 105      POC TCO2 (MEASURED) 26      POC Anion Gap 16      POC Ionized Calcium 1.23      POC Hematocrit 38      Sample VENOUS      Site Other      Allens Test N/A     ISTAT CHEM8          Imaging Results              CT Abdomen Pelvis With IV Contrast NO Oral Contrast (In process)  Result time 25 19:19:36                     Medications   0.9% NaCl infusion (0 mLs Intravenous Stopped 25 184)   iohexoL (OMNIPAQUE 350) injection 75 mL (75 mLs Intravenous Given 25 180)     Medical Decision Making  This is an emergent evaluation of a 29-year-old female  who presents to the emergency department for evaluation of pain and drainage from  scar x 2 days.    Physical exam as above.    Differential diagnosis includes but is not limited to cellulitis, abscess, other infection.    Workup initiated with basic labs, lactic acid, PT, CT abdomen and pelvis with IV contrast.  Ordered fluids.  Vital signs, chart, labs, and/or imaging were all reviewed.  See ED course below and interpretations above. My overall impression is cellulitis. Will discharge home with clindamycin. Vital signs are reassuring. Patient/Caregiver is stable for discharge at this time.  Patient/Caregiver was informed of results, plan of care, and are comfortable with this.  All questions and concerns were addressed. Discussed strict return precautions with the patient/caregiver. Instructed follow up with primary care provider within 1 week.      David Reynoso  NATI    DISCLAIMER: This note was prepared with [employer] voice recognition transcription software. Garbled syntax, mangled pronouns, and other bizarre constructions may be attributed to that software system.       Amount and/or Complexity of Data Reviewed  Labs: ordered. Decision-making details documented in ED Course.  Radiology: ordered.    Risk  Prescription drug management.               ED Course as of 07/05/25 1932   Sat Jul 05, 2025   1627 BP(!): 102/58 [TM]   1627 Temp: 98.4 °F (36.9 °C) [TM]   1627 Pulse: 89 [TM]   1628 Resp: 18 [TM]   1628 SpO2: 98 % [TM]   1706 ISTAT PROCEDURE  Chem 8 unremarkable. [TM]   1707 POCT urine pregnancy  Negative [TM]   1719 CBC auto differential(!)  CBC is without leukocytosis, H and H of 11.7/35.8.  Normal platelet count. [TM]   1737 Comprehensive metabolic panel(!)  CMP with normal renal function, no electrolyte derangement. [TM]   1754 Lactic acid, plasma  Normal [TM]   1837 BP: 108/73 [TM]   1837 Temp: 97.7 °F (36.5 °C) [TM]   1837 Pulse: 81 [TM]   1837 Resp: 18 [TM]   1837 SpO2: 100 % [TM]   1928 Spoke with Dr. Araujo via telephone who says there is no abscess or fluid collection where patient has drainages.  Will treat for cellulitis. [TM]   1929 Informed patient of results.  She has upcoming appointment with OBGYN.  Will discharge home with clindamycin. [TM]      ED Course User Index  [TM] David Reynoso PA-C                           Clinical Impression:  Final diagnoses:  [Z51.89] Visit for wound check (Primary)  [L03.311] Cellulitis of abdominal wall          ED Disposition Condition    Discharge Stable          ED Prescriptions       Medication Sig Dispense Start Date End Date Auth. Provider    clindamycin (CLEOCIN) 150 MG capsule Take 3 capsules (450 mg total) by mouth 3 (three) times daily. for 7 days 63 capsule 7/5/2025 7/12/2025 David Reynoso PA-C          Follow-up Information       Follow up With Specialties Details Why Contact Info    [facility] -  Emergency Dept Emergency Medicine Go to  As needed, If symptoms worsen, or new symptoms develop [address]  [facility] [place] [id number] [phone]    Primary care doctor  Schedule an appointment as soon as possible for a visit in 3 days      Karlos Akins MD Obstetrics, Obstetrics and Gynecology   [address]  [address]  [facility] [id number] [phone]                     [1]   Social History  Tobacco Use    Smoking status: Never     Passive exposure: Never    Smokeless tobacco: Never   Substance Use Topics    Alcohol use: Never    Drug use: Never        David Reynoso PA-C  07/05/25 1932

## 2025-07-05 NOTE — DISCHARGE INSTRUCTIONS
Ou te wè nan depatman ijans luna a. Tanpri pran tout medikaman cristino yo preskri e cristino nou te diskite.  Swiv ak espesyalis si enstriksyon migdalia fè sa.  Li enpòtan migdalia sonje ke gen kèk pwoblèm ki difisil migdalia dyagnostike epi yo ka pa jwenn pandan vizit Depatman Ijans ou a. Asire w ou fè swivi ak doktè swen prensipal ou a epi revize tout laboratwa/imaj/tès yo te fè pandan vizit sa a avèk yo. Gen kèk laboratwa/tès ki ka andeyò ranje nòmal la epi yo mande migdalia swivi kalee florence plis envestigasyon migdalia jaycob fè dyagnostik/ekskli/anpeche konplikasyon oswa lòt kondisyon medikal. Retounen nan [facility] ijans migdalia nenpòt nouvo sentòm oswa sentòm ki ophelia pi grav. Mèsi paske w te pèmèt mwen pran swen w luna a, se te yon plezi. Mwen espere ou sonia w pi byen byento!